# Patient Record
Sex: MALE | Race: WHITE
[De-identification: names, ages, dates, MRNs, and addresses within clinical notes are randomized per-mention and may not be internally consistent; named-entity substitution may affect disease eponyms.]

---

## 2019-07-18 ENCOUNTER — HOSPITAL ENCOUNTER (EMERGENCY)
Dept: HOSPITAL 8 - ED | Age: 64
Discharge: HOME | End: 2019-07-18
Payer: MEDICAID

## 2019-07-18 VITALS — DIASTOLIC BLOOD PRESSURE: 73 MMHG | SYSTOLIC BLOOD PRESSURE: 117 MMHG

## 2019-07-18 VITALS — WEIGHT: 218.26 LBS | BODY MASS INDEX: 32.33 KG/M2 | HEIGHT: 69 IN

## 2019-07-18 DIAGNOSIS — F17.210: ICD-10-CM

## 2019-07-18 DIAGNOSIS — I10: ICD-10-CM

## 2019-07-18 DIAGNOSIS — I48.92: ICD-10-CM

## 2019-07-18 DIAGNOSIS — I48.91: Primary | ICD-10-CM

## 2019-07-18 LAB
ALBUMIN SERPL-MCNC: 3.5 G/DL (ref 3.4–5)
ANION GAP SERPL CALC-SCNC: 6 MMOL/L (ref 5–15)
APTT BLD: 30 SECONDS (ref 25–31)
BASOPHILS # BLD AUTO: 0.04 X10^3/UL (ref 0–0.1)
BASOPHILS NFR BLD AUTO: 1 % (ref 0–1)
CALCIUM SERPL-MCNC: 8.2 MG/DL (ref 8.5–10.1)
CHLORIDE SERPL-SCNC: 115 MMOL/L (ref 98–107)
CREAT SERPL-MCNC: 0.96 MG/DL (ref 0.7–1.3)
EOSINOPHIL # BLD AUTO: 0.09 X10^3/UL (ref 0–0.4)
EOSINOPHIL NFR BLD AUTO: 1 % (ref 1–7)
ERYTHROCYTE [DISTWIDTH] IN BLOOD BY AUTOMATED COUNT: 12.9 % (ref 9.4–14.8)
INR PPP: 0.98 (ref 0.93–1.1)
LYMPHOCYTES # BLD AUTO: 2.96 X10^3/UL (ref 1–3.4)
LYMPHOCYTES NFR BLD AUTO: 33 % (ref 22–44)
MCH RBC QN AUTO: 33.1 PG (ref 27.5–34.5)
MCHC RBC AUTO-ENTMCNC: 33.8 G/DL (ref 33.2–36.2)
MCV RBC AUTO: 97.8 FL (ref 81–97)
MD: NO
MONOCYTES # BLD AUTO: 0.69 X10^3/UL (ref 0.2–0.8)
MONOCYTES NFR BLD AUTO: 8 % (ref 2–9)
NEUTROPHILS # BLD AUTO: 5.17 X10^3/UL (ref 1.8–6.8)
NEUTROPHILS NFR BLD AUTO: 58 % (ref 42–75)
PLATELET # BLD AUTO: 166 X10^3/UL (ref 130–400)
PMV BLD AUTO: 9.1 FL (ref 7.4–10.4)
PROTHROMBIN TIME: 10.3 SECONDS (ref 9.6–11.5)
RBC # BLD AUTO: 4.97 X10^6/UL (ref 4.38–5.82)
TROPONIN I SERPL-MCNC: < 0.015 NG/ML (ref 0–0.04)

## 2019-07-18 PROCEDURE — 93005 ELECTROCARDIOGRAM TRACING: CPT

## 2019-07-18 PROCEDURE — 84484 ASSAY OF TROPONIN QUANT: CPT

## 2019-07-18 PROCEDURE — 85730 THROMBOPLASTIN TIME PARTIAL: CPT

## 2019-07-18 PROCEDURE — 80048 BASIC METABOLIC PNL TOTAL CA: CPT

## 2019-07-18 PROCEDURE — 36415 COLL VENOUS BLD VENIPUNCTURE: CPT

## 2019-07-18 PROCEDURE — 71045 X-RAY EXAM CHEST 1 VIEW: CPT

## 2019-07-18 PROCEDURE — 85025 COMPLETE CBC W/AUTO DIFF WBC: CPT

## 2019-07-18 PROCEDURE — 96374 THER/PROPH/DIAG INJ IV PUSH: CPT

## 2019-07-18 PROCEDURE — 96375 TX/PRO/DX INJ NEW DRUG ADDON: CPT

## 2019-07-18 PROCEDURE — 82040 ASSAY OF SERUM ALBUMIN: CPT

## 2019-07-18 PROCEDURE — 85610 PROTHROMBIN TIME: CPT

## 2019-07-18 PROCEDURE — 92960 CARDIOVERSION ELECTRIC EXT: CPT

## 2019-07-18 PROCEDURE — 99285 EMERGENCY DEPT VISIT HI MDM: CPT

## 2019-07-18 PROCEDURE — 99152 MOD SED SAME PHYS/QHP 5/>YRS: CPT

## 2019-07-18 NOTE — NUR
PLAN CLARIFIED WITH PROVIDER: AFTER CLARIFICATION WITH PATIENT'S 
CARDIOLOGIST-AVOID ASPIRIN (TOOK ASA LESS THEN 12 HOURS), AVOID DILTSZEM. PLAN 
FOR ELECTRICAL CARDIOVERSION

WRITER OBTAINING CONSENTS FOR PROVIDER/VERIFYING ROOM SETUP

PATIENT MADE AWARE

PHYSICAL EXAM UNCHANGED 146, 115/87, 99% ON 2L NC

## 2019-07-18 NOTE — NUR
MEDICATED PER EMAR FOR 5/10 LEFT CHEST PAIN DESCRIBED AS SHARP 

  REPEAT EKG OBTAINED AS WELL

VITALS STABLE ON MONITOR 71, 110/71
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
## 2019-07-18 NOTE — NUR
Patient reports chest pain completely improved 0/10

  Patient tolerating po fluids

  Vitals remain stable

Walked to restroom w/ steady gait and was able to void



Writer reviewed symptoms to watch for/what requires re-evaluation

Plan to f/u w/ cardiologist,  start Eliquis BID (patient provided with coupon 
from provider) & avoid stress/nicotine/caffiene

## 2019-07-18 NOTE — NUR
CONSENT OBTAINED AT 8:39

 TIME OUT AT 8:40

 50MG OF PROPOFOL BY DR. HEATON AT 8:47

 30MG OF  PROPOFOL BY DR HEATON AT 8:49

 200 JOULES AT 8:50 WITH IMMEDIATE CHANGE FROM AFLUTTER/AFIB TO NORMAL SINUS 
RHYTHM

AS PATIENT RECOVERED BURPING/HEAVING NOTED- 4MG OF IV ZOFRAN  AT 8:57-NO 
VOMITING

  NO AIRWAY/BLOOD PRESSURE INTERVENTIONS NEEDED (PATIENT MAINTAINED AIRWAY W/ 
6L NC/B/P REMAIN STABLE)

  PATIENT FULLY AWAKE AND PAIN FREE AT 9:03

REMAINS IN NSR FROM 65-75

## 2019-12-12 ENCOUNTER — HOSPITAL ENCOUNTER (OUTPATIENT)
Dept: HOSPITAL 8 - CVU | Age: 64
Discharge: HOME | End: 2019-12-12
Attending: INTERNAL MEDICINE
Payer: COMMERCIAL

## 2019-12-12 DIAGNOSIS — Z72.0: ICD-10-CM

## 2019-12-12 DIAGNOSIS — I35.8: Primary | ICD-10-CM

## 2019-12-12 DIAGNOSIS — I48.91: ICD-10-CM

## 2019-12-12 PROCEDURE — 93306 TTE W/DOPPLER COMPLETE: CPT

## 2020-12-12 ENCOUNTER — HOSPITAL ENCOUNTER (OUTPATIENT)
Dept: LAB | Facility: MEDICAL CENTER | Age: 65
End: 2020-12-12
Attending: DERMATOLOGY
Payer: COMMERCIAL

## 2020-12-12 LAB
COVID ORDER STATUS COVID19: NORMAL
SARS-COV-2 RNA RESP QL NAA+PROBE: NOTDETECTED
SPECIMEN SOURCE: NORMAL

## 2020-12-12 PROCEDURE — U0003 INFECTIOUS AGENT DETECTION BY NUCLEIC ACID (DNA OR RNA); SEVERE ACUTE RESPIRATORY SYNDROME CORONAVIRUS 2 (SARS-COV-2) (CORONAVIRUS DISEASE [COVID-19]), AMPLIFIED PROBE TECHNIQUE, MAKING USE OF HIGH THROUGHPUT TECHNOLOGIES AS DESCRIBED BY CMS-2020-01-R: HCPCS

## 2020-12-12 PROCEDURE — C9803 HOPD COVID-19 SPEC COLLECT: HCPCS

## 2021-03-24 ENCOUNTER — IMMUNIZATION (OUTPATIENT)
Dept: FAMILY PLANNING/WOMEN'S HEALTH CLINIC | Facility: IMMUNIZATION CENTER | Age: 66
End: 2021-03-24
Payer: COMMERCIAL

## 2021-03-24 DIAGNOSIS — Z23 ENCOUNTER FOR VACCINATION: Primary | ICD-10-CM

## 2021-03-24 PROCEDURE — 91300 PFIZER SARS-COV-2 VACCINE: CPT

## 2021-03-24 PROCEDURE — 0001A PFIZER SARS-COV-2 VACCINE: CPT

## 2021-04-16 ENCOUNTER — IMMUNIZATION (OUTPATIENT)
Dept: FAMILY PLANNING/WOMEN'S HEALTH CLINIC | Facility: IMMUNIZATION CENTER | Age: 66
End: 2021-04-16
Attending: INTERNAL MEDICINE
Payer: OTHER GOVERNMENT

## 2021-04-16 DIAGNOSIS — Z23 ENCOUNTER FOR VACCINATION: Primary | ICD-10-CM

## 2021-04-16 PROCEDURE — 0002A PFIZER SARS-COV-2 VACCINE: CPT

## 2021-04-16 PROCEDURE — 91300 PFIZER SARS-COV-2 VACCINE: CPT

## 2022-08-17 ENCOUNTER — APPOINTMENT (OUTPATIENT)
Dept: RADIOLOGY | Facility: MEDICAL CENTER | Age: 67
DRG: 958 | End: 2022-08-17
Attending: ORTHOPAEDIC SURGERY
Payer: MEDICARE

## 2022-08-17 ENCOUNTER — APPOINTMENT (OUTPATIENT)
Dept: RADIOLOGY | Facility: MEDICAL CENTER | Age: 67
DRG: 958 | End: 2022-08-17
Attending: EMERGENCY MEDICINE
Payer: MEDICARE

## 2022-08-17 ENCOUNTER — APPOINTMENT (OUTPATIENT)
Dept: RADIOLOGY | Facility: MEDICAL CENTER | Age: 67
DRG: 958 | End: 2022-08-17
Attending: SURGERY
Payer: MEDICARE

## 2022-08-17 ENCOUNTER — APPOINTMENT (OUTPATIENT)
Dept: RADIOLOGY | Facility: MEDICAL CENTER | Age: 67
DRG: 958 | End: 2022-08-17
Attending: NURSE PRACTITIONER
Payer: MEDICARE

## 2022-08-17 ENCOUNTER — ANESTHESIA (OUTPATIENT)
Dept: SURGERY | Facility: MEDICAL CENTER | Age: 67
DRG: 958 | End: 2022-08-17
Payer: MEDICARE

## 2022-08-17 ENCOUNTER — ANESTHESIA EVENT (OUTPATIENT)
Dept: SURGERY | Facility: MEDICAL CENTER | Age: 67
DRG: 958 | End: 2022-08-17
Payer: MEDICARE

## 2022-08-17 ENCOUNTER — HOSPITAL ENCOUNTER (INPATIENT)
Facility: MEDICAL CENTER | Age: 67
LOS: 13 days | DRG: 958 | End: 2022-08-30
Attending: EMERGENCY MEDICINE | Admitting: SURGERY
Payer: MEDICARE

## 2022-08-17 DIAGNOSIS — R18.8 FREE FLUID IN PELVIS: ICD-10-CM

## 2022-08-17 DIAGNOSIS — S32.422A CLOSED DISPLACED FRACTURE OF POSTERIOR WALL OF LEFT ACETABULUM, INITIAL ENCOUNTER (HCC): ICD-10-CM

## 2022-08-17 DIAGNOSIS — S81.832A: ICD-10-CM

## 2022-08-17 DIAGNOSIS — Z78.9 NO CONTRAINDICATION TO DEEP VEIN THROMBOSIS (DVT) PROPHYLAXIS: ICD-10-CM

## 2022-08-17 DIAGNOSIS — M79.89 LEFT ARM SWELLING: ICD-10-CM

## 2022-08-17 DIAGNOSIS — S22.42XA: ICD-10-CM

## 2022-08-17 DIAGNOSIS — S32.009A CLOSED FRACTURE OF TRANSVERSE PROCESS OF LUMBAR VERTEBRA, INITIAL ENCOUNTER (HCC): ICD-10-CM

## 2022-08-17 DIAGNOSIS — S25.01XA: ICD-10-CM

## 2022-08-17 DIAGNOSIS — S82.402C TYPE III OPEN FRACTURE OF LEFT TIBIA AND FIBULA, INITIAL ENCOUNTER: ICD-10-CM

## 2022-08-17 DIAGNOSIS — S82.202C TYPE III OPEN FRACTURE OF LEFT TIBIA AND FIBULA, INITIAL ENCOUNTER: ICD-10-CM

## 2022-08-17 DIAGNOSIS — T14.90XA TRAUMA: ICD-10-CM

## 2022-08-17 PROBLEM — N40.0 BENIGN PROSTATIC HYPERPLASIA WITHOUT LOWER URINARY TRACT SYMPTOMS: Chronic | Status: ACTIVE | Noted: 2022-08-17

## 2022-08-17 PROBLEM — S22.39XA RIB FRACTURE: Status: ACTIVE | Noted: 2022-08-17

## 2022-08-17 PROBLEM — Z53.09 CONTRAINDICATION TO DEEP VEIN THROMBOSIS (DVT) PROPHYLAXIS: Status: ACTIVE | Noted: 2022-08-17

## 2022-08-17 PROBLEM — S82.402B OPEN FRACTURE OF LEFT TIBIA AND FIBULA: Status: ACTIVE | Noted: 2022-08-17

## 2022-08-17 PROBLEM — S82.202B OPEN FRACTURE OF LEFT TIBIA AND FIBULA: Status: ACTIVE | Noted: 2022-08-17

## 2022-08-17 PROBLEM — S42.293A HUMERAL HEAD FRACTURE: Status: ACTIVE | Noted: 2022-08-17

## 2022-08-17 LAB
ABO GROUP BLD: NORMAL
ALBUMIN SERPL BCP-MCNC: 4.1 G/DL (ref 3.2–4.9)
ALBUMIN/GLOB SERPL: 1.6 G/DL
ALP SERPL-CCNC: 44 U/L (ref 30–99)
ALT SERPL-CCNC: 97 U/L (ref 2–50)
ANION GAP SERPL CALC-SCNC: 10 MMOL/L (ref 7–16)
APTT PPP: 24.9 SEC (ref 24.7–36)
AST SERPL-CCNC: 113 U/L (ref 12–45)
BILIRUB SERPL-MCNC: 0.7 MG/DL (ref 0.1–1.5)
BLD GP AB SCN SERPL QL: NORMAL
BUN SERPL-MCNC: 15 MG/DL (ref 8–22)
CALCIUM SERPL-MCNC: 8.6 MG/DL (ref 8.5–10.5)
CHLORIDE SERPL-SCNC: 108 MMOL/L (ref 96–112)
CO2 SERPL-SCNC: 20 MMOL/L (ref 20–33)
CREAT SERPL-MCNC: 1 MG/DL (ref 0.5–1.4)
ERYTHROCYTE [DISTWIDTH] IN BLOOD BY AUTOMATED COUNT: 48 FL (ref 35.9–50)
ETHANOL BLD-MCNC: <10.1 MG/DL
GFR SERPLBLD CREATININE-BSD FMLA CKD-EPI: 83 ML/MIN/1.73 M 2
GLOBULIN SER CALC-MCNC: 2.6 G/DL (ref 1.9–3.5)
GLUCOSE SERPL-MCNC: 133 MG/DL (ref 65–99)
HCT VFR BLD AUTO: 42.8 % (ref 42–52)
HGB BLD-MCNC: 14.7 G/DL (ref 14–18)
INR PPP: 1.07 (ref 0.87–1.13)
MCH RBC QN AUTO: 33.2 PG (ref 27–33)
MCHC RBC AUTO-ENTMCNC: 34.3 G/DL (ref 33.7–35.3)
MCV RBC AUTO: 96.6 FL (ref 81.4–97.8)
PLATELET # BLD AUTO: 149 K/UL (ref 164–446)
PMV BLD AUTO: 11.1 FL (ref 9–12.9)
POTASSIUM SERPL-SCNC: 4.1 MMOL/L (ref 3.6–5.5)
PROT SERPL-MCNC: 6.7 G/DL (ref 6–8.2)
PROTHROMBIN TIME: 13.8 SEC (ref 12–14.6)
RBC # BLD AUTO: 4.43 M/UL (ref 4.7–6.1)
RH BLD: NORMAL
SODIUM SERPL-SCNC: 138 MMOL/L (ref 135–145)
WBC # BLD AUTO: 14.4 K/UL (ref 4.8–10.8)

## 2022-08-17 PROCEDURE — 73090 X-RAY EXAM OF FOREARM: CPT | Mod: LT

## 2022-08-17 PROCEDURE — 700111 HCHG RX REV CODE 636 W/ 250 OVERRIDE (IP): Performed by: ANESTHESIOLOGY

## 2022-08-17 PROCEDURE — 160002 HCHG RECOVERY MINUTES (STAT): Performed by: SURGERY

## 2022-08-17 PROCEDURE — 72125 CT NECK SPINE W/O DYE: CPT

## 2022-08-17 PROCEDURE — 71260 CT THORAX DX C+: CPT

## 2022-08-17 PROCEDURE — 0QSH35Z REPOSITION LEFT TIBIA WITH EXTERNAL FIXATION DEVICE, PERCUTANEOUS APPROACH: ICD-10-PCS | Performed by: ORTHOPAEDIC SURGERY

## 2022-08-17 PROCEDURE — 502240 HCHG MISC OR SUPPLY RC 0272: Performed by: SURGERY

## 2022-08-17 PROCEDURE — 700101 HCHG RX REV CODE 250: Performed by: ANESTHESIOLOGY

## 2022-08-17 PROCEDURE — 700111 HCHG RX REV CODE 636 W/ 250 OVERRIDE (IP): Performed by: ORTHOPAEDIC SURGERY

## 2022-08-17 PROCEDURE — 700105 HCHG RX REV CODE 258: Performed by: SURGERY

## 2022-08-17 PROCEDURE — 770022 HCHG ROOM/CARE - ICU (200)

## 2022-08-17 PROCEDURE — 700117 HCHG RX CONTRAST REV CODE 255: Performed by: SURGERY

## 2022-08-17 PROCEDURE — 160009 HCHG ANES TIME/MIN: Performed by: SURGERY

## 2022-08-17 PROCEDURE — 02VW3DZ RESTRICTION OF THORACIC AORTA, DESCENDING WITH INTRALUMINAL DEVICE, PERCUTANEOUS APPROACH: ICD-10-PCS | Performed by: SURGERY

## 2022-08-17 PROCEDURE — 80053 COMPREHEN METABOLIC PANEL: CPT

## 2022-08-17 PROCEDURE — 99291 CRITICAL CARE FIRST HOUR: CPT

## 2022-08-17 PROCEDURE — 110454 HCHG SHELL REV 250: Performed by: SURGERY

## 2022-08-17 PROCEDURE — 82077 ASSAY SPEC XCP UR&BREATH IA: CPT

## 2022-08-17 PROCEDURE — 72170 X-RAY EXAM OF PELVIS: CPT

## 2022-08-17 PROCEDURE — 20690 APPL UNIPLN UNI EXT FIXJ SYS: CPT | Mod: LT | Performed by: ORTHOPAEDIC SURGERY

## 2022-08-17 PROCEDURE — 99221 1ST HOSP IP/OBS SF/LOW 40: CPT | Mod: 25 | Performed by: SURGERY

## 2022-08-17 PROCEDURE — 36556 INSERT NON-TUNNEL CV CATH: CPT | Performed by: SURGERY

## 2022-08-17 PROCEDURE — 73120 X-RAY EXAM OF HAND: CPT | Mod: LT

## 2022-08-17 PROCEDURE — 700117 HCHG RX CONTRAST REV CODE 255: Performed by: EMERGENCY MEDICINE

## 2022-08-17 PROCEDURE — 71045 X-RAY EXAM CHEST 1 VIEW: CPT

## 2022-08-17 PROCEDURE — 36415 COLL VENOUS BLD VENIPUNCTURE: CPT

## 2022-08-17 PROCEDURE — C1760 CLOSURE DEV, VASC: HCPCS | Performed by: SURGERY

## 2022-08-17 PROCEDURE — 72128 CT CHEST SPINE W/O DYE: CPT

## 2022-08-17 PROCEDURE — 27372 REMOVAL OF FOREIGN BODY: CPT | Mod: LT | Performed by: ORTHOPAEDIC SURGERY

## 2022-08-17 PROCEDURE — 700105 HCHG RX REV CODE 258: Performed by: ANESTHESIOLOGY

## 2022-08-17 PROCEDURE — 700111 HCHG RX REV CODE 636 W/ 250 OVERRIDE (IP): Performed by: SURGERY

## 2022-08-17 PROCEDURE — 700111 HCHG RX REV CODE 636 W/ 250 OVERRIDE (IP)

## 2022-08-17 PROCEDURE — 96374 THER/PROPH/DIAG INJ IV PUSH: CPT

## 2022-08-17 PROCEDURE — 110371 HCHG SHELL REV 272: Performed by: SURGERY

## 2022-08-17 PROCEDURE — 70450 CT HEAD/BRAIN W/O DYE: CPT

## 2022-08-17 PROCEDURE — C1769 GUIDE WIRE: HCPCS | Performed by: SURGERY

## 2022-08-17 PROCEDURE — 01392 ANES OPN PX UPR TIB FIB&/PAT: CPT | Performed by: ANESTHESIOLOGY

## 2022-08-17 PROCEDURE — 85730 THROMBOPLASTIN TIME PARTIAL: CPT

## 2022-08-17 PROCEDURE — 700117 HCHG RX CONTRAST REV CODE 255: Performed by: NURSE PRACTITIONER

## 2022-08-17 PROCEDURE — 11012 DEB SKIN BONE AT FX SITE: CPT | Mod: LT | Performed by: ORTHOPAEDIC SURGERY

## 2022-08-17 PROCEDURE — 0JCM3ZZ EXTIRPATION OF MATTER FROM LEFT UPPER LEG SUBCUTANEOUS TISSUE AND FASCIA, PERCUTANEOUS APPROACH: ICD-10-PCS | Performed by: ORTHOPAEDIC SURGERY

## 2022-08-17 PROCEDURE — 20103 EXPL PENTRG WOUND EXTREMITY: CPT | Mod: 59 | Performed by: ORTHOPAEDIC SURGERY

## 2022-08-17 PROCEDURE — 36620 INSERTION CATHETER ARTERY: CPT | Performed by: SURGERY

## 2022-08-17 PROCEDURE — C1887 CATHETER, GUIDING: HCPCS | Performed by: SURGERY

## 2022-08-17 PROCEDURE — 85018 HEMOGLOBIN: CPT

## 2022-08-17 PROCEDURE — 700101 HCHG RX REV CODE 250: Performed by: SPECIALIST

## 2022-08-17 PROCEDURE — 73706 CT ANGIO LWR EXTR W/O&W/DYE: CPT | Mod: LT

## 2022-08-17 PROCEDURE — 700101 HCHG RX REV CODE 250: Performed by: NURSE PRACTITIONER

## 2022-08-17 PROCEDURE — 99223 1ST HOSP IP/OBS HIGH 75: CPT | Performed by: ORTHOPAEDIC SURGERY

## 2022-08-17 PROCEDURE — 03HY32Z INSERTION OF MONITORING DEVICE INTO UPPER ARTERY, PERCUTANEOUS APPROACH: ICD-10-PCS | Performed by: SURGERY

## 2022-08-17 PROCEDURE — 0KBR0ZZ EXCISION OF LEFT UPPER LEG MUSCLE, OPEN APPROACH: ICD-10-PCS | Performed by: ORTHOPAEDIC SURGERY

## 2022-08-17 PROCEDURE — 34705 EVAC RPR A-BIILIAC NDGFT: CPT | Performed by: SURGERY

## 2022-08-17 PROCEDURE — 36620 INSERTION CATHETER ARTERY: CPT

## 2022-08-17 PROCEDURE — 160029 HCHG SURGERY MINUTES - 1ST 30 MINS LEVEL 4: Performed by: SURGERY

## 2022-08-17 PROCEDURE — C1894 INTRO/SHEATH, NON-LASER: HCPCS | Performed by: SURGERY

## 2022-08-17 PROCEDURE — C1725 CATH, TRANSLUMIN NON-LASER: HCPCS | Performed by: SURGERY

## 2022-08-17 PROCEDURE — 72131 CT LUMBAR SPINE W/O DYE: CPT

## 2022-08-17 PROCEDURE — 86850 RBC ANTIBODY SCREEN: CPT

## 2022-08-17 PROCEDURE — 85027 COMPLETE CBC AUTOMATED: CPT

## 2022-08-17 PROCEDURE — G0390 TRAUMA RESPONS W/HOSP CRITI: HCPCS

## 2022-08-17 PROCEDURE — 73590 X-RAY EXAM OF LOWER LEG: CPT | Mod: LT

## 2022-08-17 PROCEDURE — 160048 HCHG OR STATISTICAL LEVEL 1-5: Performed by: SURGERY

## 2022-08-17 PROCEDURE — C1751 CATH, INF, PER/CENT/MIDLINE: HCPCS

## 2022-08-17 PROCEDURE — 36556 INSERT NON-TUNNEL CV CATH: CPT

## 2022-08-17 PROCEDURE — 96375 TX/PRO/DX INJ NEW DRUG ADDON: CPT

## 2022-08-17 PROCEDURE — 3E0234Z INTRODUCTION OF SERUM, TOXOID AND VACCINE INTO MUSCLE, PERCUTANEOUS APPROACH: ICD-10-PCS | Performed by: EMERGENCY MEDICINE

## 2022-08-17 PROCEDURE — 3E0T3BZ INTRODUCTION OF ANESTHETIC AGENT INTO PERIPHERAL NERVES AND PLEXI, PERCUTANEOUS APPROACH: ICD-10-PCS | Performed by: ANESTHESIOLOGY

## 2022-08-17 PROCEDURE — 86900 BLOOD TYPING SEROLOGIC ABO: CPT

## 2022-08-17 PROCEDURE — 85610 PROTHROMBIN TIME: CPT

## 2022-08-17 PROCEDURE — 99291 CRITICAL CARE FIRST HOUR: CPT | Mod: 25 | Performed by: SURGERY

## 2022-08-17 PROCEDURE — 90715 TDAP VACCINE 7 YRS/> IM: CPT | Performed by: EMERGENCY MEDICINE

## 2022-08-17 PROCEDURE — 160035 HCHG PACU - 1ST 60 MINS PHASE I: Performed by: SURGERY

## 2022-08-17 PROCEDURE — 73600 X-RAY EXAM OF ANKLE: CPT | Mod: LT

## 2022-08-17 PROCEDURE — 90471 IMMUNIZATION ADMIN: CPT

## 2022-08-17 PROCEDURE — 502000 HCHG MISC OR IMPLANTS RC 0278: Performed by: SURGERY

## 2022-08-17 PROCEDURE — 700102 HCHG RX REV CODE 250 W/ 637 OVERRIDE(OP): Performed by: SPECIALIST

## 2022-08-17 PROCEDURE — 160041 HCHG SURGERY MINUTES - EA ADDL 1 MIN LEVEL 4: Performed by: SURGERY

## 2022-08-17 PROCEDURE — 700111 HCHG RX REV CODE 636 W/ 250 OVERRIDE (IP): Performed by: EMERGENCY MEDICINE

## 2022-08-17 PROCEDURE — 02H633Z INSERTION OF INFUSION DEVICE INTO RIGHT ATRIUM, PERCUTANEOUS APPROACH: ICD-10-PCS | Performed by: SURGERY

## 2022-08-17 PROCEDURE — A9270 NON-COVERED ITEM OR SERVICE: HCPCS | Performed by: SPECIALIST

## 2022-08-17 PROCEDURE — 700101 HCHG RX REV CODE 250: Performed by: SURGERY

## 2022-08-17 PROCEDURE — 86901 BLOOD TYPING SEROLOGIC RH(D): CPT

## 2022-08-17 DEVICE — DEVICE CLSR 6FR HMST IMPL SLF STS PLUS ANGIOSEAL (10EA/CA): Type: IMPLANTABLE DEVICE | Site: GROIN | Status: FUNCTIONAL

## 2022-08-17 DEVICE — GRAFT STENT TAG CONFORM THORACIS GORE L10 CM OD34 MM (1EA): Type: IMPLANTABLE DEVICE | Site: GROIN | Status: FUNCTIONAL

## 2022-08-17 RX ORDER — HYDROMORPHONE HYDROCHLORIDE 1 MG/ML
0.1 INJECTION, SOLUTION INTRAMUSCULAR; INTRAVENOUS; SUBCUTANEOUS
Status: DISCONTINUED | OUTPATIENT
Start: 2022-08-17 | End: 2022-08-17 | Stop reason: HOSPADM

## 2022-08-17 RX ORDER — ONDANSETRON 2 MG/ML
INJECTION INTRAMUSCULAR; INTRAVENOUS PRN
Status: DISCONTINUED | OUTPATIENT
Start: 2022-08-17 | End: 2022-08-17 | Stop reason: SURG

## 2022-08-17 RX ORDER — SODIUM CHLORIDE, SODIUM LACTATE, POTASSIUM CHLORIDE, CALCIUM CHLORIDE 600; 310; 30; 20 MG/100ML; MG/100ML; MG/100ML; MG/100ML
INJECTION, SOLUTION INTRAVENOUS
Status: DISCONTINUED | OUTPATIENT
Start: 2022-08-17 | End: 2022-08-17 | Stop reason: SURG

## 2022-08-17 RX ORDER — IODIXANOL 270 MG/ML
INJECTION, SOLUTION INTRAVASCULAR
Status: DISCONTINUED | OUTPATIENT
Start: 2022-08-17 | End: 2022-08-17 | Stop reason: HOSPADM

## 2022-08-17 RX ORDER — ONDANSETRON 2 MG/ML
INJECTION INTRAMUSCULAR; INTRAVENOUS
Status: DISPENSED
Start: 2022-08-17 | End: 2022-08-18

## 2022-08-17 RX ORDER — FAMOTIDINE 20 MG/1
20 TABLET, FILM COATED ORAL 2 TIMES DAILY
Status: DISCONTINUED | OUTPATIENT
Start: 2022-08-17 | End: 2022-08-20

## 2022-08-17 RX ORDER — HYDRALAZINE HYDROCHLORIDE 20 MG/ML
5 INJECTION INTRAMUSCULAR; INTRAVENOUS
Status: DISCONTINUED | OUTPATIENT
Start: 2022-08-17 | End: 2022-08-17 | Stop reason: HOSPADM

## 2022-08-17 RX ORDER — ONDANSETRON 2 MG/ML
4 INJECTION INTRAMUSCULAR; INTRAVENOUS EVERY 4 HOURS PRN
Status: DISCONTINUED | OUTPATIENT
Start: 2022-08-17 | End: 2022-08-30 | Stop reason: HOSPADM

## 2022-08-17 RX ORDER — HEPARIN SODIUM,PORCINE 1000/ML
VIAL (ML) INJECTION PRN
Status: DISCONTINUED | OUTPATIENT
Start: 2022-08-17 | End: 2022-08-17 | Stop reason: SURG

## 2022-08-17 RX ORDER — DIPHENHYDRAMINE HYDROCHLORIDE 50 MG/ML
12.5 INJECTION INTRAMUSCULAR; INTRAVENOUS
Status: DISCONTINUED | OUTPATIENT
Start: 2022-08-17 | End: 2022-08-17 | Stop reason: HOSPADM

## 2022-08-17 RX ORDER — METAXALONE 800 MG/1
400 TABLET ORAL 3 TIMES DAILY
Status: DISCONTINUED | OUTPATIENT
Start: 2022-08-17 | End: 2022-08-27

## 2022-08-17 RX ORDER — HYDRALAZINE HYDROCHLORIDE 20 MG/ML
INJECTION INTRAMUSCULAR; INTRAVENOUS PRN
Status: DISCONTINUED | OUTPATIENT
Start: 2022-08-17 | End: 2022-08-17 | Stop reason: SURG

## 2022-08-17 RX ORDER — BISACODYL 10 MG
10 SUPPOSITORY, RECTAL RECTAL
Status: DISCONTINUED | OUTPATIENT
Start: 2022-08-17 | End: 2022-08-30 | Stop reason: HOSPADM

## 2022-08-17 RX ORDER — HYDROMORPHONE HYDROCHLORIDE 1 MG/ML
INJECTION, SOLUTION INTRAMUSCULAR; INTRAVENOUS; SUBCUTANEOUS
Status: COMPLETED
Start: 2022-08-17 | End: 2022-08-17

## 2022-08-17 RX ORDER — LABETALOL HYDROCHLORIDE 5 MG/ML
10 INJECTION, SOLUTION INTRAVENOUS ONCE
Status: COMPLETED | OUTPATIENT
Start: 2022-08-17 | End: 2022-08-17

## 2022-08-17 RX ORDER — ENEMA 19; 7 G/133ML; G/133ML
1 ENEMA RECTAL
Status: DISCONTINUED | OUTPATIENT
Start: 2022-08-17 | End: 2022-08-30 | Stop reason: HOSPADM

## 2022-08-17 RX ORDER — HYDROMORPHONE HYDROCHLORIDE 1 MG/ML
0.4 INJECTION, SOLUTION INTRAMUSCULAR; INTRAVENOUS; SUBCUTANEOUS
Status: DISCONTINUED | OUTPATIENT
Start: 2022-08-17 | End: 2022-08-17 | Stop reason: HOSPADM

## 2022-08-17 RX ORDER — LIDOCAINE 50 MG/G
1 PATCH TOPICAL EVERY 24 HOURS
Status: DISCONTINUED | OUTPATIENT
Start: 2022-08-17 | End: 2022-08-30 | Stop reason: HOSPADM

## 2022-08-17 RX ORDER — POLYETHYLENE GLYCOL 3350 17 G/17G
1 POWDER, FOR SOLUTION ORAL 2 TIMES DAILY
Status: DISCONTINUED | OUTPATIENT
Start: 2022-08-17 | End: 2022-08-30 | Stop reason: HOSPADM

## 2022-08-17 RX ORDER — CEFAZOLIN 2 G/1
INJECTION, POWDER, FOR SOLUTION INTRAMUSCULAR; INTRAVENOUS
Status: COMPLETED | OUTPATIENT
Start: 2022-08-17 | End: 2022-08-17

## 2022-08-17 RX ORDER — ETOMIDATE 2 MG/ML
INJECTION INTRAVENOUS PRN
Status: DISCONTINUED | OUTPATIENT
Start: 2022-08-17 | End: 2022-08-17 | Stop reason: SURG

## 2022-08-17 RX ORDER — ALBUTEROL SULFATE 2.5 MG/3ML
2.5 SOLUTION RESPIRATORY (INHALATION)
Status: DISCONTINUED | OUTPATIENT
Start: 2022-08-17 | End: 2022-08-17 | Stop reason: HOSPADM

## 2022-08-17 RX ORDER — AMOXICILLIN 250 MG
1 CAPSULE ORAL
Status: DISCONTINUED | OUTPATIENT
Start: 2022-08-17 | End: 2022-08-30 | Stop reason: HOSPADM

## 2022-08-17 RX ORDER — SODIUM CHLORIDE, SODIUM LACTATE, POTASSIUM CHLORIDE, CALCIUM CHLORIDE 600; 310; 30; 20 MG/100ML; MG/100ML; MG/100ML; MG/100ML
INJECTION, SOLUTION INTRAVENOUS CONTINUOUS
Status: DISCONTINUED | OUTPATIENT
Start: 2022-08-17 | End: 2022-08-27

## 2022-08-17 RX ORDER — CEFAZOLIN SODIUM 2 G/100ML
2 INJECTION, SOLUTION INTRAVENOUS EVERY 8 HOURS
Status: DISCONTINUED | OUTPATIENT
Start: 2022-08-18 | End: 2022-08-19

## 2022-08-17 RX ORDER — OXYCODONE HCL 5 MG/5 ML
5 SOLUTION, ORAL ORAL
Status: DISCONTINUED | OUTPATIENT
Start: 2022-08-17 | End: 2022-08-17 | Stop reason: HOSPADM

## 2022-08-17 RX ORDER — OXYCODONE HCL 5 MG/5 ML
10 SOLUTION, ORAL ORAL
Status: DISCONTINUED | OUTPATIENT
Start: 2022-08-17 | End: 2022-08-17 | Stop reason: HOSPADM

## 2022-08-17 RX ORDER — CEFAZOLIN SODIUM 2 G/100ML
2 INJECTION, SOLUTION INTRAVENOUS EVERY 8 HOURS
Status: DISCONTINUED | OUTPATIENT
Start: 2022-08-18 | End: 2022-08-17

## 2022-08-17 RX ORDER — GABAPENTIN 100 MG/1
100 CAPSULE ORAL 3 TIMES DAILY
Status: DISCONTINUED | OUTPATIENT
Start: 2022-08-17 | End: 2022-08-29

## 2022-08-17 RX ORDER — ONDANSETRON 4 MG/1
4 TABLET, ORALLY DISINTEGRATING ORAL EVERY 4 HOURS PRN
Status: DISCONTINUED | OUTPATIENT
Start: 2022-08-17 | End: 2022-08-30 | Stop reason: HOSPADM

## 2022-08-17 RX ORDER — PHENYLEPHRINE HYDROCHLORIDE 10 MG/ML
INJECTION, SOLUTION INTRAMUSCULAR; INTRAVENOUS; SUBCUTANEOUS PRN
Status: DISCONTINUED | OUTPATIENT
Start: 2022-08-17 | End: 2022-08-17 | Stop reason: SURG

## 2022-08-17 RX ORDER — HYDROMORPHONE HYDROCHLORIDE 1 MG/ML
0.2 INJECTION, SOLUTION INTRAMUSCULAR; INTRAVENOUS; SUBCUTANEOUS
Status: DISCONTINUED | OUTPATIENT
Start: 2022-08-17 | End: 2022-08-17 | Stop reason: HOSPADM

## 2022-08-17 RX ORDER — CEFAZOLIN SODIUM 1 G/3ML
INJECTION, POWDER, FOR SOLUTION INTRAMUSCULAR; INTRAVENOUS PRN
Status: DISCONTINUED | OUTPATIENT
Start: 2022-08-17 | End: 2022-08-17 | Stop reason: SURG

## 2022-08-17 RX ORDER — AMOXICILLIN 250 MG
1 CAPSULE ORAL NIGHTLY
Status: DISCONTINUED | OUTPATIENT
Start: 2022-08-17 | End: 2022-08-30 | Stop reason: HOSPADM

## 2022-08-17 RX ORDER — HYDRALAZINE HYDROCHLORIDE 20 MG/ML
10 INJECTION INTRAMUSCULAR; INTRAVENOUS ONCE
Status: DISCONTINUED | OUTPATIENT
Start: 2022-08-17 | End: 2022-08-18

## 2022-08-17 RX ORDER — HYDROMORPHONE HYDROCHLORIDE 1 MG/ML
INJECTION, SOLUTION INTRAMUSCULAR; INTRAVENOUS; SUBCUTANEOUS
Status: COMPLETED | OUTPATIENT
Start: 2022-08-17 | End: 2022-08-17

## 2022-08-17 RX ORDER — DOCUSATE SODIUM 100 MG/1
100 CAPSULE, LIQUID FILLED ORAL 2 TIMES DAILY
Status: DISCONTINUED | OUTPATIENT
Start: 2022-08-17 | End: 2022-08-30 | Stop reason: HOSPADM

## 2022-08-17 RX ORDER — MORPHINE SULFATE 4 MG/ML
INJECTION INTRAVENOUS
Status: DISPENSED
Start: 2022-08-17 | End: 2022-08-18

## 2022-08-17 RX ORDER — SODIUM CHLORIDE, SODIUM GLUCONATE, SODIUM ACETATE, POTASSIUM CHLORIDE AND MAGNESIUM CHLORIDE 526; 502; 368; 37; 30 MG/100ML; MG/100ML; MG/100ML; MG/100ML; MG/100ML
INJECTION, SOLUTION INTRAVENOUS
Status: DISCONTINUED | OUTPATIENT
Start: 2022-08-17 | End: 2022-08-17 | Stop reason: SURG

## 2022-08-17 RX ORDER — SODIUM CHLORIDE, SODIUM LACTATE, POTASSIUM CHLORIDE, CALCIUM CHLORIDE 600; 310; 30; 20 MG/100ML; MG/100ML; MG/100ML; MG/100ML
INJECTION, SOLUTION INTRAVENOUS CONTINUOUS
Status: DISCONTINUED | OUTPATIENT
Start: 2022-08-17 | End: 2022-08-17 | Stop reason: HOSPADM

## 2022-08-17 RX ORDER — HYDROMORPHONE HYDROCHLORIDE 1 MG/ML
1 INJECTION, SOLUTION INTRAMUSCULAR; INTRAVENOUS; SUBCUTANEOUS ONCE
Status: COMPLETED | OUTPATIENT
Start: 2022-08-17 | End: 2022-08-17

## 2022-08-17 RX ORDER — MORPHINE SULFATE 4 MG/ML
4 INJECTION INTRAVENOUS
Status: DISCONTINUED | OUTPATIENT
Start: 2022-08-17 | End: 2022-08-27

## 2022-08-17 RX ORDER — DEXAMETHASONE SODIUM PHOSPHATE 4 MG/ML
INJECTION, SOLUTION INTRA-ARTICULAR; INTRALESIONAL; INTRAMUSCULAR; INTRAVENOUS; SOFT TISSUE PRN
Status: DISCONTINUED | OUTPATIENT
Start: 2022-08-17 | End: 2022-08-17 | Stop reason: SURG

## 2022-08-17 RX ORDER — HYDRALAZINE HYDROCHLORIDE 20 MG/ML
10 INJECTION INTRAMUSCULAR; INTRAVENOUS EVERY 4 HOURS PRN
Status: DISCONTINUED | OUTPATIENT
Start: 2022-08-17 | End: 2022-08-30 | Stop reason: HOSPADM

## 2022-08-17 RX ORDER — HALOPERIDOL 5 MG/ML
1 INJECTION INTRAMUSCULAR
Status: DISCONTINUED | OUTPATIENT
Start: 2022-08-17 | End: 2022-08-17 | Stop reason: HOSPADM

## 2022-08-17 RX ORDER — LABETALOL HYDROCHLORIDE 5 MG/ML
5 INJECTION, SOLUTION INTRAVENOUS
Status: DISCONTINUED | OUTPATIENT
Start: 2022-08-17 | End: 2022-08-17 | Stop reason: HOSPADM

## 2022-08-17 RX ORDER — PROTAMINE SULFATE 10 MG/ML
INJECTION, SOLUTION INTRAVENOUS PRN
Status: DISCONTINUED | OUTPATIENT
Start: 2022-08-17 | End: 2022-08-17 | Stop reason: SURG

## 2022-08-17 RX ORDER — KETAMINE HYDROCHLORIDE 50 MG/ML
INJECTION, SOLUTION, CONCENTRATE INTRAMUSCULAR; INTRAVENOUS PRN
Status: DISCONTINUED | OUTPATIENT
Start: 2022-08-17 | End: 2022-08-17 | Stop reason: SURG

## 2022-08-17 RX ORDER — ONDANSETRON 2 MG/ML
4 INJECTION INTRAMUSCULAR; INTRAVENOUS
Status: DISCONTINUED | OUTPATIENT
Start: 2022-08-17 | End: 2022-08-17 | Stop reason: HOSPADM

## 2022-08-17 RX ORDER — MORPHINE SULFATE 4 MG/ML
2 INJECTION INTRAVENOUS
Status: DISCONTINUED | OUTPATIENT
Start: 2022-08-17 | End: 2022-08-27

## 2022-08-17 RX ADMIN — FENTANYL CITRATE 50 MCG: 50 INJECTION, SOLUTION INTRAMUSCULAR; INTRAVENOUS at 18:57

## 2022-08-17 RX ADMIN — CLOSTRIDIUM TETANI TOXOID ANTIGEN (FORMALDEHYDE INACTIVATED), CORYNEBACTERIUM DIPHTHERIAE TOXOID ANTIGEN (FORMALDEHYDE INACTIVATED), BORDETELLA PERTUSSIS TOXOID ANTIGEN (GLUTARALDEHYDE INACTIVATED), BORDETELLA PERTUSSIS FILAMENTOUS HEMAGGLUTININ ANTIGEN (FORMALDEHYDE INACTIVATED), BORDETELLA PERTUSSIS PERTACTIN ANTIGEN, AND BORDETELLA PERTUSSIS FIMBRIAE 2/3 ANTIGEN 0.5 ML: 5; 2; 2.5; 5; 3; 5 INJECTION, SUSPENSION INTRAMUSCULAR at 16:34

## 2022-08-17 RX ADMIN — MORPHINE SULFATE 4 MG: 4 INJECTION INTRAVENOUS at 22:15

## 2022-08-17 RX ADMIN — HYDRALAZINE HYDROCHLORIDE 10 MG: 20 INJECTION INTRAMUSCULAR; INTRAVENOUS at 17:10

## 2022-08-17 RX ADMIN — HYDROMORPHONE HYDROCHLORIDE 0.4 MG: 1 INJECTION, SOLUTION INTRAMUSCULAR; INTRAVENOUS; SUBCUTANEOUS at 21:30

## 2022-08-17 RX ADMIN — PROTAMINE SULFATE 50 MG: 10 INJECTION, SOLUTION INTRAVENOUS at 19:04

## 2022-08-17 RX ADMIN — LABETALOL HYDROCHLORIDE 10 MG: 5 INJECTION, SOLUTION INTRAVENOUS at 17:30

## 2022-08-17 RX ADMIN — FENTANYL CITRATE 50 MCG: 50 INJECTION, SOLUTION INTRAMUSCULAR; INTRAVENOUS at 20:29

## 2022-08-17 RX ADMIN — IOHEXOL 100 ML: 350 INJECTION, SOLUTION INTRAVENOUS at 17:39

## 2022-08-17 RX ADMIN — HYDROMORPHONE HYDROCHLORIDE 1 MG: 1 INJECTION, SOLUTION INTRAMUSCULAR; INTRAVENOUS; SUBCUTANEOUS at 16:50

## 2022-08-17 RX ADMIN — SUGAMMADEX 200 MG: 100 INJECTION, SOLUTION INTRAVENOUS at 20:01

## 2022-08-17 RX ADMIN — SODIUM CHLORIDE, POTASSIUM CHLORIDE, SODIUM LACTATE AND CALCIUM CHLORIDE: 600; 310; 30; 20 INJECTION, SOLUTION INTRAVENOUS at 22:31

## 2022-08-17 RX ADMIN — FENTANYL CITRATE 50 MCG: 50 INJECTION, SOLUTION INTRAMUSCULAR; INTRAVENOUS at 18:20

## 2022-08-17 RX ADMIN — FAMOTIDINE 20 MG: 10 INJECTION, SOLUTION INTRAVENOUS at 23:32

## 2022-08-17 RX ADMIN — IOHEXOL 100 ML: 350 INJECTION, SOLUTION INTRAVENOUS at 17:30

## 2022-08-17 RX ADMIN — FENTANYL CITRATE 50 MCG: 50 INJECTION, SOLUTION INTRAMUSCULAR; INTRAVENOUS at 19:11

## 2022-08-17 RX ADMIN — CEFAZOLIN 2 G: 330 INJECTION, POWDER, FOR SOLUTION INTRAMUSCULAR; INTRAVENOUS at 17:59

## 2022-08-17 RX ADMIN — FENTANYL CITRATE 50 MCG: 50 INJECTION, SOLUTION INTRAMUSCULAR; INTRAVENOUS at 19:23

## 2022-08-17 RX ADMIN — Medication 25 MG: at 19:25

## 2022-08-17 RX ADMIN — PHENYLEPHRINE HYDROCHLORIDE 100 MCG: 10 INJECTION INTRAVENOUS at 18:27

## 2022-08-17 RX ADMIN — DEXAMETHASONE SODIUM PHOSPHATE 4 MG: 4 INJECTION, SOLUTION INTRA-ARTICULAR; INTRALESIONAL; INTRAMUSCULAR; INTRAVENOUS; SOFT TISSUE at 19:57

## 2022-08-17 RX ADMIN — ETOMIDATE 10 MG: 2 INJECTION INTRAVENOUS at 17:59

## 2022-08-17 RX ADMIN — GABAPENTIN 100 MG: 100 CAPSULE ORAL at 23:46

## 2022-08-17 RX ADMIN — ONDANSETRON 4 MG: 2 INJECTION INTRAMUSCULAR; INTRAVENOUS at 19:57

## 2022-08-17 RX ADMIN — LIDOCAINE 1 PATCH: 50 PATCH TOPICAL at 23:46

## 2022-08-17 RX ADMIN — HEPARIN SODIUM 7000 UNITS: 1000 INJECTION, SOLUTION INTRAVENOUS; SUBCUTANEOUS at 18:26

## 2022-08-17 RX ADMIN — PHENYLEPHRINE HYDROCHLORIDE 100 MCG: 10 INJECTION INTRAVENOUS at 18:05

## 2022-08-17 RX ADMIN — HYDRALAZINE HYDROCHLORIDE 10 MG: 20 INJECTION INTRAMUSCULAR; INTRAVENOUS at 20:16

## 2022-08-17 RX ADMIN — MIDAZOLAM 2 MG: 1 INJECTION INTRAMUSCULAR; INTRAVENOUS at 17:55

## 2022-08-17 RX ADMIN — ROCURONIUM BROMIDE 100 MG: 10 INJECTION, SOLUTION INTRAVENOUS at 17:59

## 2022-08-17 RX ADMIN — CEFAZOLIN SODIUM 2 G: 2 INJECTION, SOLUTION INTRAVENOUS at 23:46

## 2022-08-17 RX ADMIN — Medication 200 MG: at 17:59

## 2022-08-17 RX ADMIN — HYDROMORPHONE HYDROCHLORIDE 0.4 MG: 1 INJECTION, SOLUTION INTRAMUSCULAR; INTRAVENOUS; SUBCUTANEOUS at 20:59

## 2022-08-17 RX ADMIN — HYDROMORPHONE HYDROCHLORIDE 1 MG: 1 INJECTION, SOLUTION INTRAMUSCULAR; INTRAVENOUS; SUBCUTANEOUS at 16:33

## 2022-08-17 RX ADMIN — MORPHINE SULFATE 4 MG: 4 INJECTION INTRAVENOUS at 23:30

## 2022-08-17 RX ADMIN — SODIUM CHLORIDE, SODIUM GLUCONATE, SODIUM ACETATE, POTASSIUM CHLORIDE AND MAGNESIUM CHLORIDE: 526; 502; 368; 37; 30 INJECTION, SOLUTION INTRAVENOUS at 17:55

## 2022-08-17 RX ADMIN — METAXALONE 400 MG: 800 TABLET ORAL at 23:46

## 2022-08-17 RX ADMIN — HYDROMORPHONE HYDROCHLORIDE 0.4 MG: 1 INJECTION, SOLUTION INTRAMUSCULAR; INTRAVENOUS; SUBCUTANEOUS at 21:08

## 2022-08-17 RX ADMIN — SODIUM CHLORIDE, POTASSIUM CHLORIDE, SODIUM LACTATE AND CALCIUM CHLORIDE: 600; 310; 30; 20 INJECTION, SOLUTION INTRAVENOUS at 17:55

## 2022-08-17 RX ADMIN — PROPOFOL 100 MG: 10 INJECTION, EMULSION INTRAVENOUS at 17:59

## 2022-08-17 RX ADMIN — CEFAZOLIN 2 G: 2 INJECTION, POWDER, FOR SOLUTION INTRAMUSCULAR; INTRAVENOUS at 16:33

## 2022-08-17 ASSESSMENT — FIBROSIS 4 INDEX: FIB4 SCORE: 5.01
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
## 2022-08-18 ENCOUNTER — APPOINTMENT (OUTPATIENT)
Dept: RADIOLOGY | Facility: MEDICAL CENTER | Age: 67
DRG: 958 | End: 2022-08-18
Attending: SURGERY
Payer: MEDICARE

## 2022-08-18 ENCOUNTER — APPOINTMENT (OUTPATIENT)
Dept: RADIOLOGY | Facility: MEDICAL CENTER | Age: 67
DRG: 958 | End: 2022-08-18
Attending: NURSE PRACTITIONER
Payer: MEDICARE

## 2022-08-18 ENCOUNTER — HOSPITAL ENCOUNTER (OUTPATIENT)
Dept: RADIOLOGY | Facility: MEDICAL CENTER | Age: 67
End: 2022-08-18
Attending: ORTHOPAEDIC SURGERY
Payer: MEDICARE

## 2022-08-18 LAB
ABO + RH BLD: NORMAL
ALBUMIN SERPL BCP-MCNC: 3 G/DL (ref 3.2–4.9)
ALBUMIN/GLOB SERPL: 1.7 G/DL
ALP SERPL-CCNC: 29 U/L (ref 30–99)
ALT SERPL-CCNC: 84 U/L (ref 2–50)
ANION GAP SERPL CALC-SCNC: 8 MMOL/L (ref 7–16)
AST SERPL-CCNC: 158 U/L (ref 12–45)
BASOPHILS # BLD AUTO: 0.1 % (ref 0–1.8)
BASOPHILS # BLD AUTO: 0.2 % (ref 0–1.8)
BASOPHILS # BLD: 0.01 K/UL (ref 0–0.12)
BASOPHILS # BLD: 0.02 K/UL (ref 0–0.12)
BILIRUB SERPL-MCNC: 0.4 MG/DL (ref 0.1–1.5)
BUN SERPL-MCNC: 13 MG/DL (ref 8–22)
CALCIUM SERPL-MCNC: 7.4 MG/DL (ref 8.5–10.5)
CHLORIDE SERPL-SCNC: 109 MMOL/L (ref 96–112)
CO2 SERPL-SCNC: 21 MMOL/L (ref 20–33)
CREAT SERPL-MCNC: 0.75 MG/DL (ref 0.5–1.4)
EOSINOPHIL # BLD AUTO: 0 K/UL (ref 0–0.51)
EOSINOPHIL # BLD AUTO: 0 K/UL (ref 0–0.51)
EOSINOPHIL NFR BLD: 0 % (ref 0–6.9)
EOSINOPHIL NFR BLD: 0 % (ref 0–6.9)
ERYTHROCYTE [DISTWIDTH] IN BLOOD BY AUTOMATED COUNT: 48.5 FL (ref 35.9–50)
ERYTHROCYTE [DISTWIDTH] IN BLOOD BY AUTOMATED COUNT: 48.9 FL (ref 35.9–50)
GFR SERPLBLD CREATININE-BSD FMLA CKD-EPI: 100 ML/MIN/1.73 M 2
GLOBULIN SER CALC-MCNC: 1.8 G/DL (ref 1.9–3.5)
GLUCOSE BLD STRIP.AUTO-MCNC: 122 MG/DL (ref 65–99)
GLUCOSE SERPL-MCNC: 170 MG/DL (ref 65–99)
HCT VFR BLD AUTO: 27.3 % (ref 42–52)
HCT VFR BLD AUTO: 28.1 % (ref 42–52)
HGB BLD-MCNC: 10.6 G/DL (ref 14–18)
HGB BLD-MCNC: 9.3 G/DL (ref 14–18)
HGB BLD-MCNC: 9.6 G/DL (ref 14–18)
IMM GRANULOCYTES # BLD AUTO: 0.03 K/UL (ref 0–0.11)
IMM GRANULOCYTES # BLD AUTO: 0.04 K/UL (ref 0–0.11)
IMM GRANULOCYTES NFR BLD AUTO: 0.3 % (ref 0–0.9)
IMM GRANULOCYTES NFR BLD AUTO: 0.4 % (ref 0–0.9)
LYMPHOCYTES # BLD AUTO: 0.64 K/UL (ref 1–4.8)
LYMPHOCYTES # BLD AUTO: 0.88 K/UL (ref 1–4.8)
LYMPHOCYTES NFR BLD: 6.3 % (ref 22–41)
LYMPHOCYTES NFR BLD: 8.8 % (ref 22–41)
MAGNESIUM SERPL-MCNC: 1.9 MG/DL (ref 1.5–2.5)
MCH RBC QN AUTO: 33 PG (ref 27–33)
MCH RBC QN AUTO: 33.1 PG (ref 27–33)
MCHC RBC AUTO-ENTMCNC: 34.1 G/DL (ref 33.7–35.3)
MCHC RBC AUTO-ENTMCNC: 34.2 G/DL (ref 33.7–35.3)
MCV RBC AUTO: 96.6 FL (ref 81.4–97.8)
MCV RBC AUTO: 97.2 FL (ref 81.4–97.8)
MONOCYTES # BLD AUTO: 0.76 K/UL (ref 0–0.85)
MONOCYTES # BLD AUTO: 0.76 K/UL (ref 0–0.85)
MONOCYTES NFR BLD AUTO: 7.5 % (ref 0–13.4)
MONOCYTES NFR BLD AUTO: 7.6 % (ref 0–13.4)
NEUTROPHILS # BLD AUTO: 8.33 K/UL (ref 1.82–7.42)
NEUTROPHILS # BLD AUTO: 8.64 K/UL (ref 1.82–7.42)
NEUTROPHILS NFR BLD: 83.2 % (ref 44–72)
NEUTROPHILS NFR BLD: 85.6 % (ref 44–72)
NRBC # BLD AUTO: 0 K/UL
NRBC # BLD AUTO: 0 K/UL
NRBC BLD-RTO: 0 /100 WBC
NRBC BLD-RTO: 0 /100 WBC
PHOSPHATE SERPL-MCNC: 2.8 MG/DL (ref 2.5–4.5)
PLATELET # BLD AUTO: 100 K/UL (ref 164–446)
PLATELET # BLD AUTO: 105 K/UL (ref 164–446)
PMV BLD AUTO: 10.2 FL (ref 9–12.9)
PMV BLD AUTO: 10.5 FL (ref 9–12.9)
POTASSIUM SERPL-SCNC: 4.4 MMOL/L (ref 3.6–5.5)
PROT SERPL-MCNC: 4.8 G/DL (ref 6–8.2)
RBC # BLD AUTO: 2.81 M/UL (ref 4.7–6.1)
RBC # BLD AUTO: 2.91 M/UL (ref 4.7–6.1)
SODIUM SERPL-SCNC: 138 MMOL/L (ref 135–145)
WBC # BLD AUTO: 10 K/UL (ref 4.8–10.8)
WBC # BLD AUTO: 10.1 K/UL (ref 4.8–10.8)

## 2022-08-18 PROCEDURE — 700105 HCHG RX REV CODE 258: Performed by: SURGERY

## 2022-08-18 PROCEDURE — A9270 NON-COVERED ITEM OR SERVICE: HCPCS | Performed by: SURGERY

## 2022-08-18 PROCEDURE — 700117 HCHG RX CONTRAST REV CODE 255: Performed by: SURGERY

## 2022-08-18 PROCEDURE — 700111 HCHG RX REV CODE 636 W/ 250 OVERRIDE (IP): Performed by: SURGERY

## 2022-08-18 PROCEDURE — 94669 MECHANICAL CHEST WALL OSCILL: CPT

## 2022-08-18 PROCEDURE — 82962 GLUCOSE BLOOD TEST: CPT

## 2022-08-18 PROCEDURE — 73700 CT LOWER EXTREMITY W/O DYE: CPT | Mod: LT

## 2022-08-18 PROCEDURE — 37252 INTRVASC US NONCORONARY 1ST: CPT | Performed by: SURGERY

## 2022-08-18 PROCEDURE — 73610 X-RAY EXAM OF ANKLE: CPT | Mod: RT

## 2022-08-18 PROCEDURE — 99291 CRITICAL CARE FIRST HOUR: CPT | Performed by: SURGERY

## 2022-08-18 PROCEDURE — C1876 STENT, NON-COA/NON-COV W/DEL: HCPCS

## 2022-08-18 PROCEDURE — 770022 HCHG ROOM/CARE - ICU (200)

## 2022-08-18 PROCEDURE — 80053 COMPREHEN METABOLIC PANEL: CPT

## 2022-08-18 PROCEDURE — 700102 HCHG RX REV CODE 250 W/ 637 OVERRIDE(OP): Performed by: SPECIALIST

## 2022-08-18 PROCEDURE — 03743DZ DILATION OF LEFT SUBCLAVIAN ARTERY WITH INTRALUMINAL DEVICE, PERCUTANEOUS APPROACH: ICD-10-PCS | Performed by: SURGERY

## 2022-08-18 PROCEDURE — 700111 HCHG RX REV CODE 636 W/ 250 OVERRIDE (IP): Performed by: ORTHOPAEDIC SURGERY

## 2022-08-18 PROCEDURE — 83735 ASSAY OF MAGNESIUM: CPT

## 2022-08-18 PROCEDURE — A9270 NON-COVERED ITEM OR SERVICE: HCPCS | Performed by: NURSE PRACTITIONER

## 2022-08-18 PROCEDURE — A9270 NON-COVERED ITEM OR SERVICE: HCPCS | Performed by: SPECIALIST

## 2022-08-18 PROCEDURE — 700102 HCHG RX REV CODE 250 W/ 637 OVERRIDE(OP): Performed by: NURSE PRACTITIONER

## 2022-08-18 PROCEDURE — 71045 X-RAY EXAM CHEST 1 VIEW: CPT

## 2022-08-18 PROCEDURE — 700102 HCHG RX REV CODE 250 W/ 637 OVERRIDE(OP): Performed by: SURGERY

## 2022-08-18 PROCEDURE — 85025 COMPLETE CBC W/AUTO DIFF WBC: CPT | Mod: 91

## 2022-08-18 PROCEDURE — 84100 ASSAY OF PHOSPHORUS: CPT

## 2022-08-18 PROCEDURE — 37238 OPEN/PERQ PLACE STENT SAME: CPT | Mod: 79,LT | Performed by: SURGERY

## 2022-08-18 RX ORDER — HYDRALAZINE HYDROCHLORIDE 20 MG/ML
INJECTION INTRAMUSCULAR; INTRAVENOUS
Status: DISCONTINUED
Start: 2022-08-18 | End: 2022-08-19 | Stop reason: HOSPADM

## 2022-08-18 RX ORDER — TAMSULOSIN HYDROCHLORIDE 0.4 MG/1
0.4 CAPSULE ORAL
Status: DISCONTINUED | OUTPATIENT
Start: 2022-08-18 | End: 2022-08-30 | Stop reason: HOSPADM

## 2022-08-18 RX ORDER — MIDAZOLAM HYDROCHLORIDE 1 MG/ML
INJECTION INTRAMUSCULAR; INTRAVENOUS
Status: DISCONTINUED
Start: 2022-08-18 | End: 2022-08-19 | Stop reason: HOSPADM

## 2022-08-18 RX ORDER — PROTAMINE SULFATE 10 MG/ML
40 INJECTION, SOLUTION INTRAVENOUS ONCE
Status: COMPLETED | OUTPATIENT
Start: 2022-08-18 | End: 2022-08-18

## 2022-08-18 RX ORDER — HEPARIN SODIUM 1000 [USP'U]/ML
6000 INJECTION, SOLUTION INTRAVENOUS; SUBCUTANEOUS ONCE
Status: COMPLETED | OUTPATIENT
Start: 2022-08-18 | End: 2022-08-18

## 2022-08-18 RX ORDER — ENOXAPARIN SODIUM 100 MG/ML
30 INJECTION SUBCUTANEOUS EVERY 12 HOURS
Status: DISCONTINUED | OUTPATIENT
Start: 2022-08-18 | End: 2022-08-30 | Stop reason: HOSPADM

## 2022-08-18 RX ORDER — MIDAZOLAM HYDROCHLORIDE 1 MG/ML
.5-2 INJECTION INTRAMUSCULAR; INTRAVENOUS PRN
Status: DISCONTINUED | OUTPATIENT
Start: 2022-08-18 | End: 2022-08-18

## 2022-08-18 RX ORDER — ACETAMINOPHEN 325 MG/1
650 TABLET ORAL 4 TIMES DAILY
Status: DISCONTINUED | OUTPATIENT
Start: 2022-08-18 | End: 2022-08-20

## 2022-08-18 RX ORDER — HEPARIN SODIUM 1000 [USP'U]/ML
INJECTION, SOLUTION INTRAVENOUS; SUBCUTANEOUS
Status: DISCONTINUED
Start: 2022-08-18 | End: 2022-08-19 | Stop reason: HOSPADM

## 2022-08-18 RX ORDER — ONDANSETRON 2 MG/ML
4 INJECTION INTRAMUSCULAR; INTRAVENOUS PRN
Status: ACTIVE | OUTPATIENT
Start: 2022-08-18 | End: 2022-08-18

## 2022-08-18 RX ORDER — PROTAMINE SULFATE 10 MG/ML
INJECTION, SOLUTION INTRAVENOUS
Status: DISCONTINUED
Start: 2022-08-18 | End: 2022-08-19 | Stop reason: HOSPADM

## 2022-08-18 RX ORDER — IODIXANOL 270 MG/ML
35 INJECTION, SOLUTION INTRAVASCULAR ONCE
Status: COMPLETED | OUTPATIENT
Start: 2022-08-18 | End: 2022-08-18

## 2022-08-18 RX ORDER — SODIUM CHLORIDE 9 MG/ML
500 INJECTION, SOLUTION INTRAVENOUS
Status: ACTIVE | OUTPATIENT
Start: 2022-08-18 | End: 2022-08-18

## 2022-08-18 RX ORDER — OXYCODONE HYDROCHLORIDE 5 MG/1
5 TABLET ORAL
Status: DISCONTINUED | OUTPATIENT
Start: 2022-08-18 | End: 2022-08-30 | Stop reason: HOSPADM

## 2022-08-18 RX ORDER — OXYCODONE HYDROCHLORIDE 10 MG/1
10 TABLET ORAL
Status: DISCONTINUED | OUTPATIENT
Start: 2022-08-18 | End: 2022-08-30 | Stop reason: HOSPADM

## 2022-08-18 RX ADMIN — ENOXAPARIN SODIUM 30 MG: 30 INJECTION SUBCUTANEOUS at 20:02

## 2022-08-18 RX ADMIN — ACETAMINOPHEN 650 MG: 325 TABLET, FILM COATED ORAL at 21:01

## 2022-08-18 RX ADMIN — MIDAZOLAM HYDROCHLORIDE 0.5 MG: 1 INJECTION, SOLUTION INTRAMUSCULAR; INTRAVENOUS at 17:46

## 2022-08-18 RX ADMIN — OXYCODONE HYDROCHLORIDE 10 MG: 10 TABLET ORAL at 21:01

## 2022-08-18 RX ADMIN — MORPHINE SULFATE 4 MG: 4 INJECTION INTRAVENOUS at 07:49

## 2022-08-18 RX ADMIN — FENTANYL CITRATE 25 MCG: 50 INJECTION, SOLUTION INTRAMUSCULAR; INTRAVENOUS at 17:46

## 2022-08-18 RX ADMIN — CEFAZOLIN SODIUM 2 G: 2 INJECTION, SOLUTION INTRAVENOUS at 07:51

## 2022-08-18 RX ADMIN — MORPHINE SULFATE 4 MG: 4 INJECTION INTRAVENOUS at 06:34

## 2022-08-18 RX ADMIN — METAXALONE 400 MG: 800 TABLET ORAL at 12:01

## 2022-08-18 RX ADMIN — OXYCODONE HYDROCHLORIDE 10 MG: 10 TABLET ORAL at 13:46

## 2022-08-18 RX ADMIN — SODIUM CHLORIDE, POTASSIUM CHLORIDE, SODIUM LACTATE AND CALCIUM CHLORIDE: 600; 310; 30; 20 INJECTION, SOLUTION INTRAVENOUS at 21:07

## 2022-08-18 RX ADMIN — MORPHINE SULFATE 4 MG: 4 INJECTION INTRAVENOUS at 21:00

## 2022-08-18 RX ADMIN — SODIUM CHLORIDE, POTASSIUM CHLORIDE, SODIUM LACTATE AND CALCIUM CHLORIDE: 600; 310; 30; 20 INJECTION, SOLUTION INTRAVENOUS at 08:46

## 2022-08-18 RX ADMIN — METAXALONE 400 MG: 800 TABLET ORAL at 05:19

## 2022-08-18 RX ADMIN — GABAPENTIN 100 MG: 100 CAPSULE ORAL at 05:19

## 2022-08-18 RX ADMIN — TAMSULOSIN HYDROCHLORIDE 0.4 MG: 0.4 CAPSULE ORAL at 09:38

## 2022-08-18 RX ADMIN — HEPARIN SODIUM 6000 UNITS: 1000 INJECTION, SOLUTION INTRAVENOUS; SUBCUTANEOUS at 17:26

## 2022-08-18 RX ADMIN — POLYETHYLENE GLYCOL 3350 1 PACKET: 17 POWDER, FOR SOLUTION ORAL at 05:29

## 2022-08-18 RX ADMIN — MORPHINE SULFATE 4 MG: 4 INJECTION INTRAVENOUS at 20:03

## 2022-08-18 RX ADMIN — PROTAMINE SULFATE 40 MG: 10 INJECTION, SOLUTION INTRAVENOUS at 17:53

## 2022-08-18 RX ADMIN — DOCUSATE SODIUM 50 MG AND SENNOSIDES 8.6 MG 1 TABLET: 8.6; 5 TABLET, FILM COATED ORAL at 21:01

## 2022-08-18 RX ADMIN — FENTANYL CITRATE 25 MCG: 50 INJECTION, SOLUTION INTRAMUSCULAR; INTRAVENOUS at 17:19

## 2022-08-18 RX ADMIN — MORPHINE SULFATE 4 MG: 4 INJECTION INTRAVENOUS at 18:41

## 2022-08-18 RX ADMIN — MAGNESIUM HYDROXIDE 30 ML: 400 SUSPENSION ORAL at 05:29

## 2022-08-18 RX ADMIN — DOCUSATE SODIUM 100 MG: 100 CAPSULE, LIQUID FILLED ORAL at 05:29

## 2022-08-18 RX ADMIN — MORPHINE SULFATE 4 MG: 4 INJECTION INTRAVENOUS at 08:52

## 2022-08-18 RX ADMIN — OXYCODONE 5 MG: 5 TABLET ORAL at 09:37

## 2022-08-18 RX ADMIN — GABAPENTIN 100 MG: 100 CAPSULE ORAL at 12:01

## 2022-08-18 RX ADMIN — MORPHINE SULFATE 4 MG: 4 INJECTION INTRAVENOUS at 03:43

## 2022-08-18 RX ADMIN — MIDAZOLAM HYDROCHLORIDE 1 MG: 1 INJECTION, SOLUTION INTRAMUSCULAR; INTRAVENOUS at 17:19

## 2022-08-18 RX ADMIN — IODIXANOL 35 ML: 270 INJECTION, SOLUTION INTRAVASCULAR at 17:45

## 2022-08-18 RX ADMIN — FAMOTIDINE 20 MG: 10 INJECTION, SOLUTION INTRAVENOUS at 05:27

## 2022-08-18 ASSESSMENT — ENCOUNTER SYMPTOMS
HEADACHES: 0
SENSORY CHANGE: 1
DOUBLE VISION: 0
FEVER: 0
CHILLS: 0
MYALGIAS: 1
TREMORS: 0
WEAKNESS: 0
NAUSEA: 0
SHORTNESS OF BREATH: 0
SPEECH CHANGE: 0
PALPITATIONS: 0
COUGH: 1
VOMITING: 0
ABDOMINAL PAIN: 0

## 2022-08-18 ASSESSMENT — PAIN DESCRIPTION - PAIN TYPE
TYPE: ACUTE PAIN
TYPE: ACUTE PAIN;SURGICAL PAIN
TYPE: ACUTE PAIN

## 2022-08-18 ASSESSMENT — PATIENT HEALTH QUESTIONNAIRE - PHQ9
1. LITTLE INTEREST OR PLEASURE IN DOING THINGS: NOT AT ALL
2. FEELING DOWN, DEPRESSED, IRRITABLE, OR HOPELESS: NOT AT ALL
SUM OF ALL RESPONSES TO PHQ9 QUESTIONS 1 AND 2: 0

## 2022-08-18 ASSESSMENT — FIBROSIS 4 INDEX: FIB4 SCORE: 10.67

## 2022-08-18 NOTE — PROGRESS NOTES
ACUTE CARE VASCULAR SERVICE  PROGRESS NOTE    Patient did well overnight    However the left upper extremity demonstrates intermittent numbness and discomfort.  This is particularly noticeable in the hand and could be at least partially related to the coverage of the left subclavian artery with the aortic stent.  I did measure a systolic pressure of 60 in the left brachial artery and the Doppler signal sounds fairly brisk however down at the wrist the Doppler signals are weak and we are not able to get a pulse oximetry reading on the fingers of the left hand.  Currently motor and sensory exam is intact.    All things considered I think the best option at this point would be to stent the left subclavian artery to optimize perfusion of the left upper extremity.  This can be done through a left brachial artery access.  I had a detailed discussion with the patient regarding the nature of the situation and the recommendation to place a stent to improve blood flow.  Questions were answered and he agreed to proceed.    Gilbert Lawrence MD  [facility]  [place] preferred. Otherwise text to cell [phone] or call my office [phone]  __________________________________________________________________  Patient:Ezekiel Sanz   MRN:[id number]   CSN:[id number]

## 2022-08-18 NOTE — ANESTHESIA TIME REPORT
Anesthesia Start and Stop Event Times     Date Time Event    8/17/2022 1745 Ready for Procedure     1755 Anesthesia Start     2032 Anesthesia Stop        Responsible Staff  08/17/22    Name Role Begin End    Zaki Cuadra M.D. Anesth 1755 1828    Tomás Bailey M.D. Anesth 1828 2032        Overtime Reason:  no overtime (within assigned shift)    Comments:

## 2022-08-18 NOTE — PROCEDURES
Procedure Report    Procedure: Arterial line placement    Surgeon: Sharon Bunn MD    Diagnosis: Trauma     Indications: Aortic injury    Procedure in detail: The patient's  right wrist was sterilely prepped and draped. The ultrasound was used to locate the vascular structures and   a right  radial arterial line was placed using the modified seldinger technique.  The catheter was secured to the skin with silk suture and a pressure line was attached to the catheter with good waveform on the monitor.  Sterile dressings were applied. The patient tolerated the procedure well.      Sharon Bunn MD  [facility]  [phone]
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
## 2022-08-18 NOTE — ASSESSMENT & PLAN NOTE
Mildly displaced right lateral 5th rib fracture.  Displaced left 12th rib fracture. Fractures of anterolateral left 3rd-7th ribs.  Aggressive pulmonary hygiene and multimodal pain management and serial chest radiography.   8/21 Chest x-ray with increased opacities, continue aggressive pulmonary hygiene  - Transition to high flow  8/22 Continue diuresis    8/24 CT chest with no pulmonary embolism.  Small to moderate left effusion. Density consistent with serous nature.  Chest tube deferred. Serial CXR  8/26 Thoracentesis not performed due to low volume of effusion.  Trend CXR imaging.

## 2022-08-18 NOTE — OP REPORT
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 with periaortic hematoma    Procedure:  - Endovascular repair of descending thoracic aortic injury with [place] thoracic aortic endograft 34 mm x 10 cm  - Nonselective catheter placement in the thoracic aorta  - thoracic aortogram  -Right common femoral artery exposure  -Percutaneous access of the left common femoral artery with ultrasound guidance    -------------------------------------------------------------------------------------------------    Surgeon:   Gilbert Lawrence MD    Co-Surgeon:   None    Assistant:   None    Anesthesia:   ROBERT    IVF:    Per anesthesia record    EBL:    Minimal    Blood Products:  none    Heparin:   Systemically heparinized, 7000 units    Contrast Dose:  30 mL    Findings:   Final stent deployment encroaches on the subclavian origin.  Blood pressure still measured approximately 70 systolic in the left arm (which is approximately 50 points lower than the right arm) and pulse oximeter on the left index finger was still able to read 98% with a good waveform.    Devices used:   [place] thoracic aortic endograft 34 mm x 10 cm    Complications:   none    Disposition:   Returned to ICU in guarded condition    -----------------------------------------------------------------------------------------------------      Procedure Summary:  Patient was placed supine on the OR table and GETA was administered. Abdomen and groins were prepped and draped in the usual fashion. Timeout was called to  identify the correct patient, procedure, and equipment, and everyone was in agreement. Patient did receive preoperative prophylactic antibiotics.    Oblique incisions were made in the right groin and carried down to the femoral sheath using cautery and clips as needed to assure hemostasis.  The common femoral artery was identified and encircled proximally and distally with vessel loops.  The patient was systemically heparinized and this was maintained until the sheaths were removed.  The right common femoral artery was accessed and a glidewire was passed into the aorta followed by a 6fr sheath.  Omniflush catheter was advanced, then a lunderquist wire was passed and the omniflush removed.  A 22 French Eagle dry seal sheath was placed up the right side without any difficulty.    At this point we performed ultrasound-guided percutaneous access of the left common femoral artery and we placed a 6 French sheath.  We advanced a Cantrell wire and an Omni Flush catheter to the aortic arch.  We performed an aortic arch angiogram which identified the origins of the arch vessels.  The Eagle thoracic graft was advanced up to the subclavian artery origin and it was deployed with intent to partially encroach on the left subclavian origin to maximize proximal seal zone relative to the injury.  Completion angiogram showed sluggish flow through the left subclavian artery although there does appear to be some antegrade flow getting into the left subclavian artery.  Blood pressure cuff on that side showed a systolic pressure of 70 which was about 50 points lower than the systemic pressure.  Pulse oximeter on the left index finger showed 98% with a good waveform.  The hand appears to be adequately perfused despite the subclavian artery encroachment and will be followed clinically for now.    At this point the left femoral 6 French sheath was removed and the site was closed with a 6 French Angio-Seal closure device which deployed  successfully.  The right femoral sheath was removed and the arteriotomy was closed with interrupted 5-0 Prolene sutures.  The arteriotomy was flushed and irrigated prior to closure and this verified good antegrade and retrograde bleeding with no evidence of thrombosis.  Once closed the clamps were removed and there was a strong pulse distal to the site of repair.  At the end of the case I did check pedal pulses and the dorsalis pedis was found to be palpable bilaterally.  Topical hemostatic was applied and the heparin was reversed with protamine.    The wires and sheaths were removed and the arteriotomies closed with interrupted prolene sutures.  Once reperfused, both feet had palpable pulses.    The incisions were closed with multiple layers of absorbable sutures and skin glue.    All counts were correct. Patient tolerated the procedure well.  The surgery was turned over to the orthopedic team for repair of a left leg fracture.    Gilbert Lawrence MD  General and Vascular Surgery  [facility]  [phone]  Cell: [phone]

## 2022-08-18 NOTE — ASSESSMENT & PLAN NOTE
Prophylactic anticoagulation for thrombotic prevention initially contraindicated secondary to elevated bleeding risk..  8/18 Prophylactic Lovenox initiated.  8/22 Lovenox held due to need for transfusion.  8/23 Prophylactic Lovenox resumed.

## 2022-08-18 NOTE — OR NURSING
2027: Pt arrived from OR on bed on 8L O2 via simple mask. Pt placed on monitor. VSS. Pt with incision to R groin, open to air, C/D/I. Pt with ex-fix appliance on LLE. @ open wounds to L thigh and L ankle dressed with gauze/tegaderm, bleeding through.    2045: Dressings to L thigh and L ankle reinforced. Ice pack to LLE.     2100: CXR done for line placement. Pt came out from OR with IV fluids infusing in central line.     2110: Pt medicated for pain.    2120: Report called to KARMA Schwab.    2142: Pt taken to SICU on monitor and O2 with RN.
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
## 2022-08-18 NOTE — PROGRESS NOTES
"   Orthopaedic Progress Note    Interval changes:  Patient doing well post op   Dressings saturated with sanguinous-ok for nursing to change PRN for saturation    ROS - Patient denies any new issues.  Pain well controlled.    /63   Pulse 86   Temp 36.4 °C (97.5 °F) (Bladder)   Resp (!) 21   Ht 1.753 m (5' 9\")   Wt 93.5 kg (206 lb 2.1 oz)   SpO2 98%       Patient seen and examined  No acute distress  Breathing non labored  RRR  LLE proximal I&D dressings with sanguinous saturation. Distal LLE dressing saturated, ex fix in place without loosening, DVNI, moves all toes, cap refill <2 sec.     Recent Labs     08/17/22  1622 08/17/22  2350 08/18/22  0520 08/18/22  0948   WBC 14.4*  --  10.1 10.0   RBC 4.43*  --  2.91* 2.81*   HEMOGLOBIN 14.7 10.6* 9.6* 9.3*   HEMATOCRIT 42.8  --  28.1* 27.3*   MCV 96.6  --  96.6 97.2   MCH 33.2*  --  33.0 33.1*   MCHC 34.3  --  34.2 34.1   RDW 48.0  --  48.5 48.9   PLATELETCT 149*  --  105* 100*   MPV 11.1  --  10.5 10.2       Active Hospital Problems    Diagnosis     Type III open fracture of left tibia and fibula [S82.202C, S82.402C]      Priority: High    Fracture of ribs, seven, left, closed, initial encounter [S22.42XA]      Priority: High    Traumatic tear of thoracic aorta [S25.01XA]      Priority: High    Contraindication to deep vein thrombosis (DVT) prophylaxis [Z53.09]      Priority: Medium    Penetrating injury of left lower extremity [S81.832A]      Priority: Medium    Closed fracture of transverse process of lumbar vertebra (HCC) [S32.009A]      Priority: Medium    Closed fracture of posterior wall of left acetabulum (HCC) [S32.422A]      Priority: Medium    Free fluid in pelvis [R18.8]      Priority: Medium    Humeral head fracture [S42.293A]      Priority: Medium    Benign prostatic hyperplasia without lower urinary tract symptoms [N40.0]      Priority: Medium    Trauma [T14.90XA]      Priority: Low    Left arm swelling [M79.89]      Priority: Low "       Assessment/Plan:  Patient doing well post op   Dressings saturated with sanguinous-ok for nursing to change PRN for saturation  POD#1 S/P   1.  Irrigation debridement open fracture   2.  External fixation left leg   3.  Exploration left thigh penetrating wound  Wt bearing status - NWB LLE  Wound care/Drains - Dressings to be changed every other day by nursing  Future Procedures - pending definitive fixation once soft tissue edema improved  Sutures/Staples out- 14 days post operatively  PT/OT-initiated  Antibiotics: ancef 2g IV Q8  DVT Prophylaxis- TEDS/SCDs/Foot pumps  Levy-none  Case Coordination for Discharge Planning - Disposition pending final therapy recs

## 2022-08-18 NOTE — PROCEDURES
Procedure Report    Procedure: Central line placement    Surgeon: Sharon Bunn MD    Diagnosis: Trauma, Aortic injury    Indications: Need for access    Procedure in detail: Full barrier precautions were used for the procedure including cap, sterile gown, sterile gloves, and sterile full body drape.The patient's  right  superior anterior chest wall  was prepped and draped in the standard sterile fashion. Lidocaine was injected in the skin and subcutaneous tissues for anesthesia.  The  right subclavian vein was accessed with a needle. The modified seldinger technique was used to place a 7Fr 20cm triple lumen catheter.   The catheter was secured to the skin with silk suture.   Sterile dressings were applied, including a biopatch. The patient tolerated the procedure well.  A chest x-ray was ordered for verification.     Sharon Bunn MD

## 2022-08-18 NOTE — ANESTHESIA PROCEDURE NOTES
Airway    Date/Time: 8/17/2022 6:00 PM  Performed by: Zaki Cuadra M.D.  Authorized by: Zaki Cuadra M.D.     Location:  OR  Urgency:  Elective  Indications for Airway Management:  Anesthesia      Spontaneous Ventilation: absent    Sedation Level:  Deep  Preoxygenated: Yes    Patient Position:  Sniffing  Final Airway Type:  Endotracheal airway  Final Endotracheal Airway:  ETT  Cuffed: Yes    Technique Used for Successful ETT Placement:  Video laryngoscopy    Insertion Site:  Oral  Blade Type:  Anthony  Laryngoscope Blade/Videolaryngoscope Blade Size:  4  ETT Size (mm):  8.5  Measured from:  Teeth  ETT to Teeth (cm):  24  Placement Verified by: auscultation and capnometry    Cormack-Lehane Classification:  Grade I - full view of glottis  Number of Attempts at Approach:  1
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
## 2022-08-18 NOTE — CARE PLAN
The patient is Watcher - Medium risk of patient condition declining or worsening    Shift Goals  Clinical Goals: hemodynamic stability, pulse check  Patient Goals: pain control  Family Goals: updates    Progress made toward(s) clinical / shift goals:    Problem: Pain - Standard  Goal: Alleviation of pain or a reduction in pain to the patient’s comfort goal  Outcome: Progressing     Problem: Knowledge Deficit - Standard  Goal: Patient and family/care givers will demonstrate understanding of plan of care, disease process/condition, diagnostic tests and medications  Outcome: Progressing     Problem: Skin Integrity  Goal: Skin integrity is maintained or improved  Outcome: Progressing     Problem: Fall Risk  Goal: Patient will remain free from falls  Outcome: Progressing

## 2022-08-18 NOTE — ASSESSMENT & PLAN NOTE
Aortic traumatic injury involving the distal arch/proximal descending aorta with a pseudoaneurysm and a periaortic hematoma. Hemorrhage tracks along the left common carotid and subclavian arteries at the thoracic inlet.  8/17 Endovascular repair of descending thoracic aortic injury.   8/18 Numbness in left hand and markedly different blood pressures in left and right arms - Return to OR for subclavian stent  8/25 Begin daily ASA.  Continue Lovenox.  DC home on ASA and Plavix   Gilbert Lawrence MD. Vascular Surgery.

## 2022-08-18 NOTE — PROGRESS NOTES
ACUTE CARE VASCULAR SERVICE  PROGRESS NOTE  Traumatic thoracic aortic tear  OR emergently for stent graft repair, 34mm by 10cm landing at the left subclavian should cover the injury    Gilbert Lawrence MD  [facility]  Voalte preferred. Otherwise text to cell [phone] or call my office [phone]  __________________________________________________________________  Patient:Jeremy Twenty-Four   MRN:[id number]   CSN:[id number]

## 2022-08-18 NOTE — PROGRESS NOTES
Patient arrived to S126 with ACLS RN on continuous monitoring. Dr. Bunn placing central line and arterial line. Unable to perform 4 eyes skin assessment at this time.
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
## 2022-08-18 NOTE — ASSESSMENT & PLAN NOTE
Articular fracture left humeral head versus AVN on CT.  Diagnostic imaging with no evidence of fracture or dislocation.  Weight bearing status - Weightbearing as tolerated COLTON Pelaez MD. Orthopedic Surgeon. [facility].

## 2022-08-18 NOTE — ASSESSMENT & PLAN NOTE
Fracture of the posterior left acetabular wall.  Non-operative management.  Weight bearing status - Nonweightbearing LLE.  Onofre Pelaez MD. Orthopedic Surgeon. [facility].
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
## 2022-08-18 NOTE — CARE PLAN
The patient is Watcher - Medium risk of patient condition declining or worsening    Shift Goals  Clinical Goals: pain control, hemodynamic stability  Patient Goals: pain control  Family Goals: updates    Progress made toward(s) clinical / shift goals:    Problem: Pain - Standard  Goal: Alleviation of pain or a reduction in pain to the patient’s comfort goal  Outcome: Progressing     Problem: Knowledge Deficit - Standard  Goal: Patient and family/care givers will demonstrate understanding of plan of care, disease process/condition, diagnostic tests and medications  Outcome: Progressing     Problem: Skin Integrity  Goal: Skin integrity is maintained or improved  Outcome: Progressing     Problem: Fall Risk  Goal: Patient will remain free from falls  Outcome: Progressing       Patient is not progressing towards the following goals:

## 2022-08-18 NOTE — PROGRESS NOTES
Pt arrives to floor with OR team at 2145. Primary RN Josselin assumes pt care. Continuous monitoring in place.     Temp: 97.5 F  HR: 91  RR: 14  BP: 135/61  SpO2: 97 on 6 L oxymask  Weight: 93.3 kg    Discussed call light/phone system, communication board and POC. Pt is AAO  x 4 and drowsy. Fall precautions in place. Bed alarm on. Bed is locked and in low position. Treaded slippers in place.     Pt belongings:      2 RN skin check complete with Josselin, primary RN    Current impaired skin areas:    - abrasion nose  - laceration to L cheek  - redness to R cheek  - bruising, redness to chest and abdomen  - R groin surgical site CDI  - R hand skin tear  - R forearm skin tear  - L handlaceration to   - L forearm skin tear  - RLE scattered abrasions, bruising  - L posterior thigh surgical dressing, with sanguineous drainage - reinforced  - L lower leg with external fixator in place, pin sites clean with sanguineous drainage  - L ankle with surgical dressings with sanguineous drainage - reinforced    Devices in place:      Devices assessed and repositioned PRN.     The following preventative measures and interventions in place: Q2 turns in place, heels/elbows floated on pillow, mepilex placed to sacrum, surgical dressings observed and reinforced appropriately    Wound consult placedYES/NO: N/A

## 2022-08-18 NOTE — PROGRESS NOTES
"      Mental status adequate for full examination?: Yes    Spine cleared (radiologically and/or clinically): Yes    REVIEW OF SYSTEMS:  Review of Systems   Constitutional:  Negative for chills and fever.   Eyes:  Negative for double vision.   Respiratory:  Positive for cough. Negative for shortness of breath.    Cardiovascular:  Negative for palpitations.   Gastrointestinal:  Negative for abdominal pain, nausea and vomiting.   Genitourinary:  Positive for urgency (history of BPH).   Musculoskeletal:  Positive for myalgias (chest wall pain).   Neurological:  Positive for sensory change (left arm). Negative for tremors, speech change, weakness and headaches.     PHYSICAL EXAMINATION:  Blood Pressure 128/61   Pulse 73   Temperature 36.4 °C (97.5 °F) (Bladder)   Respiration 14   Height 1.753 m (5' 9\")   Weight 93.5 kg (206 lb 2.1 oz)   Oxygen Saturation 97%   Body Mass Index 30.44 kg/m²   Physical Exam  Vitals and nursing note reviewed.   Constitutional:       Appearance: He is not toxic-appearing.   HENT:      Right Ear: External ear normal.      Left Ear: External ear normal.      Nose:      Comments: Nasal bridge swelling     Mouth/Throat:      Mouth: Mucous membranes are moist.   Eyes:      Conjunctiva/sclera: Conjunctivae normal.      Pupils: Pupils are equal, round, and reactive to light.   Cardiovascular:      Rate and Rhythm: Normal rate and regular rhythm.      Comments: Doppler left radial pulse  Pulmonary:      Effort: Pulmonary effort is normal. No respiratory distress.      Breath sounds: Examination of the right-lower field reveals decreased breath sounds. Examination of the left-lower field reveals decreased breath sounds. Decreased breath sounds present.   Chest:      Chest wall: Tenderness present.   Abdominal:      General: There is no distension.      Palpations: Abdomen is soft.      Tenderness: There is no abdominal tenderness. There is no guarding.   Genitourinary:     Comments: Mildred in " place with yellow urine  Musculoskeletal:         General: Swelling, tenderness and signs of injury present.      Cervical back: No tenderness.      Comments: External fixator in place to left leg, minimal bleeding from pin sites  Left thigh dressing in place   Right ankle ecchymosis and edema    Skin:     General: Skin is warm.      Capillary Refill: Capillary refill takes less than 2 seconds.      Comments: Multiple abrasions to upper extremities     Neurological:      Mental Status: He is alert and oriented to person, place, and time.   Psychiatric:         Behavior: Behavior normal.       LABORATORY VALUES:  Recent Labs     08/17/22  1622 08/17/22  2350 08/18/22  0520   WBC 14.4*  --  10.1   RBC 4.43*  --  2.91*   HEMOGLOBIN 14.7 10.6* 9.6*   HEMATOCRIT 42.8  --  28.1*   MCV 96.6  --  96.6   MCH 33.2*  --  33.0   MCHC 34.3  --  34.2   RDW 48.0  --  48.5   PLATELETCT 149*  --  105*   MPV 11.1  --  10.5     Recent Labs     08/17/22  1622 08/18/22  0520   SODIUM 138 138   POTASSIUM 4.1 4.4   CHLORIDE 108 109   CO2 20 21   GLUCOSE 133* 170*   BUN 15 13   CREATININE 1.00 0.75   CALCIUM 8.6 7.4*     Recent Labs     08/17/22  1622 08/18/22  0520   ASTSGOT 113* 158*   ALTSGPT 97* 84*   TBILIRUBIN 0.7 0.4   ALKPHOSPHAT 44 29*   GLOBULIN 2.6 1.8*   INR 1.07  --      Recent Labs     08/17/22 1622   APTT 24.9   INR 1.07       IMAGING:  DX-CHEST-PORTABLE (1 VIEW)   Final Result         1.  Pulmonary edema and/or infiltrates are identified, which are stable since the prior exam.   2.  Small right pleural effusion   3.  Right rib fractures   4.  Cardiomegaly      CT-ANKLE W/O PLUS RECONS LEFT   Final Result         1.  Comminuted distal tibial metadiaphyseal fracture extending into the ankle mortise joint.   2.  Oblique comminuted distal fibular diaphyseal fracture      DX-CHEST-LIMITED (1 VIEW)   Final Result         1.  Pulmonary edema and/or infiltrates are identified, which are stable since the prior exam.   2.  Small  right pleural effusion   3.  Right rib fractures   4.  Cardiomegaly      DX-ANKLE 2- VIEWS LEFT   Final Result         1.  Comminuted distal tibial metadiaphyseal fracture   2.  Oblique distal fibular diaphyseal fracture      CT-CTA LOWER EXT WITH & W/O-POST PROCESS LEFT   Final Result      1.  Penetrating injury to the mid to upper LEFT thigh posterolaterally with edema and hemorrhage in the subcutaneous soft tissues and underlying muscle.  Multiple foci of enhancement consistent with ongoing arterial hemorrhage likely from muscular    .   2.  Severely comminuted open fractures of the distal LEFT tibia and fibula.      These findings were electronically conveyed to and received by BRIAN COHN on 8/17/2022 5:47 PM.         CT-CHEST,ABDOMEN,PELVIS WITH   Final Result   Addendum (preliminary) 1 of 1   These findings were discussed with Dr Bunn on 8/17/2022 5:25 PM.      Final      1.  Traumatic tear of the proximal descending thoracic aorta with associated periaortic hemorrhage.   2.  Bilateral rib fractures as detailed above.   3.  Solid organs of the abdomen are intact.   4.  Small amount of fluid in the pelvis, concerning for hemoperitoneum and potential bowel injury.   5.  LEFT posterior acetabular fracture.   6.  Articular fracture LEFT humeral head versus AVN.   7.  Multiple liver cysts.      CT-TSPINE W/O PLUS RECONS   Final Result      1.  Aortic traumatic injury involving the distal arch/proximal descending aorta with a pseudoaneurysm and a periaortic hematoma. Hemorrhage tracks along the left common carotid and subclavian arteries at the thoracic inlet.   2.  Minimal loss of height of T1 and T3 is of indeterminate age but could be chronic.   3.  Fracture of the 12th rib on the left.   4.  Fracture of the transverse process of L2 on the left.   5.  Demineralization which limits evaluation.      CT-LSPINE W/O PLUS RECONS   Final Result      1.  Fracture of the transverse process of L2 on the  left.   2.  Fracture of the 12th rib.   3.  Degenerative changes particularly in the lower lumbar spine.   4.  Small amount of free fluid in the pelvis.   5.  Fracture of the posterior left acetabulum      CT-HEAD W/O   Final Result      Head CT without contrast within normal limits. No evidence of acute cerebral infarction, hemorrhage or mass lesion.         CT-CSPINE WITHOUT PLUS RECONS   Final Result      1.  No evidence of cervical spine fracture.      2.  Partial visualization of superior mediastinal hematoma.      DX-HAND 2- LEFT   Final Result      1.  Marked dorsal soft tissue swelling.   2.  No fracture or dislocation of LEFT hand.   3.  Mild osteoarthritis.      DX-FOREARM LEFT   Final Result      1.  No fracture of LEFT forearm.   2.  Dorsal soft tissue swelling at the wrist.      DX-PELVIS-1 OR 2 VIEWS   Final Result      Limited exam showing no pelvic fracture.      DX-TIBIA AND FIBULA LEFT   Final Result      Comminuted displaced fractures of the distal left tibia and fibula.      DX-CHEST-LIMITED (1 VIEW)   Final Result      1.  Limited exam showing hypoinflation and mild displaced lateral RIGHT 5th rib fracture.   2.  No pneumothorax.      US-ABORTED US PROCEDURE    (Results Pending)   DX-PORTABLE FLUOROSCOPY < 1 HOUR    (Results Pending)   IR-THORACIC AORTOGRAM    (Results Pending)   US-TRAUMA VEIN SCREEN LOWER BILAT EXTREMITY    (Results Pending)   DX-ANKLE 3+ VIEWS RIGHT    (Results Pending)       All current laboratory studies/radiology exams reviewed: Yes    Completed Consultations:  Dr. Pelaez and Dr. Back, orthopedic surgery  Dr. Lawrence, vascular surgery     Pending Consultations:  None    Newly Identified Diagnoses and Injuries:  Right ankle swelling, x-ray pending    RAP Score Total: 16    CAGE Results: not completed Blood Alcohol>0.08: no     Discussed patient condition with RN, Patient, and trauma surgery, Dr. Liao.
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
## 2022-08-18 NOTE — ASSESSMENT & PLAN NOTE
Comminuted displaced fractures of the distal left tibia and fibula with puncture wound to left shin.  CT ankle comminuted distal tibial metadiaphyseal fracture extending into the ankle mortise joint. Oblique comminuted distal fibular diaphyseal fracture.  8/17 Irrigation debridement open fracture and external fixation left leg..   8/19 Open treatment of left tibia shaft fracture with insertion of intramedullary implant.Open external fixation left distal tibia intra-articular fracture with fixation of fibula  Removal external fixator under anesthesia  Weight bearing status - Nonweightbearing LLE.   Darius Back MD. Orthopedic Surgeon. [facility].
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
## 2022-08-19 ENCOUNTER — ANESTHESIA EVENT (OUTPATIENT)
Dept: SURGERY | Facility: MEDICAL CENTER | Age: 67
DRG: 958 | End: 2022-08-19
Payer: MEDICARE

## 2022-08-19 ENCOUNTER — APPOINTMENT (OUTPATIENT)
Dept: RADIOLOGY | Facility: MEDICAL CENTER | Age: 67
DRG: 958 | End: 2022-08-19
Attending: ORTHOPAEDIC SURGERY
Payer: MEDICARE

## 2022-08-19 ENCOUNTER — APPOINTMENT (OUTPATIENT)
Dept: RADIOLOGY | Facility: MEDICAL CENTER | Age: 67
DRG: 958 | End: 2022-08-19
Attending: SURGERY
Payer: MEDICARE

## 2022-08-19 ENCOUNTER — ANESTHESIA (OUTPATIENT)
Dept: SURGERY | Facility: MEDICAL CENTER | Age: 67
DRG: 958 | End: 2022-08-19
Payer: MEDICARE

## 2022-08-19 LAB
ALBUMIN SERPL BCP-MCNC: 2.4 G/DL (ref 3.2–4.9)
ALBUMIN/GLOB SERPL: 1.3 G/DL
ALP SERPL-CCNC: 29 U/L (ref 30–99)
ALT SERPL-CCNC: 65 U/L (ref 2–50)
ANION GAP SERPL CALC-SCNC: 6 MMOL/L (ref 7–16)
AST SERPL-CCNC: 130 U/L (ref 12–45)
BARCODED ABORH UBTYP: 5100
BARCODED PRD CODE UBPRD: NORMAL
BARCODED UNIT NUM UBUNT: NORMAL
BASOPHILS # BLD AUTO: 0.2 % (ref 0–1.8)
BASOPHILS # BLD: 0.02 K/UL (ref 0–0.12)
BILIRUB SERPL-MCNC: 0.5 MG/DL (ref 0.1–1.5)
BUN SERPL-MCNC: 12 MG/DL (ref 8–22)
CALCIUM SERPL-MCNC: 7 MG/DL (ref 8.5–10.5)
CHLORIDE SERPL-SCNC: 105 MMOL/L (ref 96–112)
CO2 SERPL-SCNC: 25 MMOL/L (ref 20–33)
COMPONENT R 8504R: NORMAL
CREAT SERPL-MCNC: 0.71 MG/DL (ref 0.5–1.4)
EOSINOPHIL # BLD AUTO: 0.03 K/UL (ref 0–0.51)
EOSINOPHIL NFR BLD: 0.4 % (ref 0–6.9)
ERYTHROCYTE [DISTWIDTH] IN BLOOD BY AUTOMATED COUNT: 48.7 FL (ref 35.9–50)
ERYTHROCYTE [DISTWIDTH] IN BLOOD BY AUTOMATED COUNT: 49 FL (ref 35.9–50)
GFR SERPLBLD CREATININE-BSD FMLA CKD-EPI: 101 ML/MIN/1.73 M 2
GLOBULIN SER CALC-MCNC: 1.8 G/DL (ref 1.9–3.5)
GLUCOSE SERPL-MCNC: 123 MG/DL (ref 65–99)
HCT VFR BLD AUTO: 19.9 % (ref 42–52)
HCT VFR BLD AUTO: 21 % (ref 42–52)
HGB BLD-MCNC: 6.6 G/DL (ref 14–18)
HGB BLD-MCNC: 7.1 G/DL (ref 14–18)
HGB RETIC QN AUTO: 35 PG/CELL (ref 29–35)
IMM GRANULOCYTES # BLD AUTO: 0.03 K/UL (ref 0–0.11)
IMM GRANULOCYTES NFR BLD AUTO: 0.4 % (ref 0–0.9)
IMM RETICS NFR: 21.3 % (ref 9.3–17.4)
LYMPHOCYTES # BLD AUTO: 1.29 K/UL (ref 1–4.8)
LYMPHOCYTES NFR BLD: 15.8 % (ref 22–41)
MCH RBC QN AUTO: 33.2 PG (ref 27–33)
MCH RBC QN AUTO: 33.6 PG (ref 27–33)
MCHC RBC AUTO-ENTMCNC: 33.2 G/DL (ref 33.7–35.3)
MCHC RBC AUTO-ENTMCNC: 33.8 G/DL (ref 33.7–35.3)
MCV RBC AUTO: 100 FL (ref 81.4–97.8)
MCV RBC AUTO: 99.5 FL (ref 81.4–97.8)
MONOCYTES # BLD AUTO: 0.71 K/UL (ref 0–0.85)
MONOCYTES NFR BLD AUTO: 8.7 % (ref 0–13.4)
NEUTROPHILS # BLD AUTO: 6.07 K/UL (ref 1.82–7.42)
NEUTROPHILS NFR BLD: 74.5 % (ref 44–72)
NRBC # BLD AUTO: 0 K/UL
NRBC BLD-RTO: 0 /100 WBC
PLATELET # BLD AUTO: 64 K/UL (ref 164–446)
PLATELET # BLD AUTO: 66 K/UL (ref 164–446)
PMV BLD AUTO: 11 FL (ref 9–12.9)
PMV BLD AUTO: 11.2 FL (ref 9–12.9)
POTASSIUM SERPL-SCNC: 4.1 MMOL/L (ref 3.6–5.5)
PRODUCT TYPE UPROD: NORMAL
PROT SERPL-MCNC: 4.2 G/DL (ref 6–8.2)
RBC # BLD AUTO: 1.99 M/UL (ref 4.7–6.1)
RBC # BLD AUTO: 2.11 M/UL (ref 4.7–6.1)
RETICS # AUTO: 0.07 M/UL (ref 0.04–0.06)
RETICS/RBC NFR: 3.4 % (ref 0.8–2.1)
SODIUM SERPL-SCNC: 136 MMOL/L (ref 135–145)
UNIT STATUS USTAT: NORMAL
WBC # BLD AUTO: 8.2 K/UL (ref 4.8–10.8)
WBC # BLD AUTO: 9.1 K/UL (ref 4.8–10.8)

## 2022-08-19 PROCEDURE — 700102 HCHG RX REV CODE 250 W/ 637 OVERRIDE(OP): Performed by: SURGERY

## 2022-08-19 PROCEDURE — A9270 NON-COVERED ITEM OR SERVICE: HCPCS | Performed by: NURSE PRACTITIONER

## 2022-08-19 PROCEDURE — 700102 HCHG RX REV CODE 250 W/ 637 OVERRIDE(OP): Performed by: SPECIALIST

## 2022-08-19 PROCEDURE — 94669 MECHANICAL CHEST WALL OSCILL: CPT

## 2022-08-19 PROCEDURE — 0QSKXZZ REPOSITION LEFT FIBULA, EXTERNAL APPROACH: ICD-10-PCS | Performed by: ORTHOPAEDIC SURGERY

## 2022-08-19 PROCEDURE — 27828 TREAT LOWER LEG FRACTURE: CPT | Mod: 58,LT | Performed by: ORTHOPAEDIC SURGERY

## 2022-08-19 PROCEDURE — 700101 HCHG RX REV CODE 250: Performed by: SPECIALIST

## 2022-08-19 PROCEDURE — 770022 HCHG ROOM/CARE - ICU (200)

## 2022-08-19 PROCEDURE — A9270 NON-COVERED ITEM OR SERVICE: HCPCS | Performed by: SPECIALIST

## 2022-08-19 PROCEDURE — A9270 NON-COVERED ITEM OR SERVICE: HCPCS | Performed by: SURGERY

## 2022-08-19 PROCEDURE — 700111 HCHG RX REV CODE 636 W/ 250 OVERRIDE (IP): Performed by: ANESTHESIOLOGY

## 2022-08-19 PROCEDURE — 73590 X-RAY EXAM OF LOWER LEG: CPT | Mod: LT

## 2022-08-19 PROCEDURE — 30243N1 TRANSFUSION OF NONAUTOLOGOUS RED BLOOD CELLS INTO CENTRAL VEIN, PERCUTANEOUS APPROACH: ICD-10-PCS | Performed by: SURGERY

## 2022-08-19 PROCEDURE — 160002 HCHG RECOVERY MINUTES (STAT): Performed by: ORTHOPAEDIC SURGERY

## 2022-08-19 PROCEDURE — 700102 HCHG RX REV CODE 250 W/ 637 OVERRIDE(OP): Performed by: ANESTHESIOLOGY

## 2022-08-19 PROCEDURE — 27759 TREATMENT OF TIBIA FRACTURE: CPT | Mod: 80ROC,58,LT | Performed by: STUDENT IN AN ORGANIZED HEALTH CARE EDUCATION/TRAINING PROGRAM

## 2022-08-19 PROCEDURE — P9016 RBC LEUKOCYTES REDUCED: HCPCS

## 2022-08-19 PROCEDURE — 71045 X-RAY EXAM CHEST 1 VIEW: CPT

## 2022-08-19 PROCEDURE — 700111 HCHG RX REV CODE 636 W/ 250 OVERRIDE (IP): Performed by: ORTHOPAEDIC SURGERY

## 2022-08-19 PROCEDURE — 27828 TREAT LOWER LEG FRACTURE: CPT | Mod: 80ROC,58,LT | Performed by: STUDENT IN AN ORGANIZED HEALTH CARE EDUCATION/TRAINING PROGRAM

## 2022-08-19 PROCEDURE — 85027 COMPLETE CBC AUTOMATED: CPT

## 2022-08-19 PROCEDURE — 99233 SBSQ HOSP IP/OBS HIGH 50: CPT | Performed by: SURGERY

## 2022-08-19 PROCEDURE — 700111 HCHG RX REV CODE 636 W/ 250 OVERRIDE (IP): Performed by: SURGERY

## 2022-08-19 PROCEDURE — 20694 RMVL EXT FIXJ SYS UNDER ANES: CPT | Mod: 58 | Performed by: ORTHOPAEDIC SURGERY

## 2022-08-19 PROCEDURE — 73060 X-RAY EXAM OF HUMERUS: CPT | Mod: LT

## 2022-08-19 PROCEDURE — 86923 COMPATIBILITY TEST ELECTRIC: CPT

## 2022-08-19 PROCEDURE — 0QSH06Z REPOSITION LEFT TIBIA WITH INTRAMEDULLARY INTERNAL FIXATION DEVICE, OPEN APPROACH: ICD-10-PCS | Performed by: ORTHOPAEDIC SURGERY

## 2022-08-19 PROCEDURE — 160035 HCHG PACU - 1ST 60 MINS PHASE I: Performed by: ORTHOPAEDIC SURGERY

## 2022-08-19 PROCEDURE — A9270 NON-COVERED ITEM OR SERVICE: HCPCS | Performed by: ANESTHESIOLOGY

## 2022-08-19 PROCEDURE — 700102 HCHG RX REV CODE 250 W/ 637 OVERRIDE(OP): Performed by: NURSE PRACTITIONER

## 2022-08-19 PROCEDURE — 3E0T3BZ INTRODUCTION OF ANESTHETIC AGENT INTO PERIPHERAL NERVES AND PLEXI, PERCUTANEOUS APPROACH: ICD-10-PCS | Performed by: ANESTHESIOLOGY

## 2022-08-19 PROCEDURE — 99140 ANES COMP EMERGENCY COND: CPT | Performed by: ANESTHESIOLOGY

## 2022-08-19 PROCEDURE — 700101 HCHG RX REV CODE 250: Performed by: ANESTHESIOLOGY

## 2022-08-19 PROCEDURE — C1713 ANCHOR/SCREW BN/BN,TIS/BN: HCPCS | Performed by: ORTHOPAEDIC SURGERY

## 2022-08-19 PROCEDURE — 85046 RETICYTE/HGB CONCENTRATE: CPT

## 2022-08-19 PROCEDURE — 160029 HCHG SURGERY MINUTES - 1ST 30 MINS LEVEL 4: Performed by: ORTHOPAEDIC SURGERY

## 2022-08-19 PROCEDURE — 160048 HCHG OR STATISTICAL LEVEL 1-5: Performed by: ORTHOPAEDIC SURGERY

## 2022-08-19 PROCEDURE — 20694 RMVL EXT FIXJ SYS UNDER ANES: CPT | Mod: 80ROC,58 | Performed by: STUDENT IN AN ORGANIZED HEALTH CARE EDUCATION/TRAINING PROGRAM

## 2022-08-19 PROCEDURE — 700105 HCHG RX REV CODE 258: Performed by: SURGERY

## 2022-08-19 PROCEDURE — 80053 COMPREHEN METABOLIC PANEL: CPT

## 2022-08-19 PROCEDURE — 160041 HCHG SURGERY MINUTES - EA ADDL 1 MIN LEVEL 4: Performed by: ORTHOPAEDIC SURGERY

## 2022-08-19 PROCEDURE — 110371 HCHG SHELL REV 272: Performed by: ORTHOPAEDIC SURGERY

## 2022-08-19 PROCEDURE — 85025 COMPLETE CBC W/AUTO DIFF WBC: CPT

## 2022-08-19 PROCEDURE — 01392 ANES OPN PX UPR TIB FIB&/PAT: CPT | Performed by: ANESTHESIOLOGY

## 2022-08-19 PROCEDURE — 160036 HCHG PACU - EA ADDL 30 MINS PHASE I: Performed by: ORTHOPAEDIC SURGERY

## 2022-08-19 PROCEDURE — 36430 TRANSFUSION BLD/BLD COMPNT: CPT

## 2022-08-19 PROCEDURE — 27759 TREATMENT OF TIBIA FRACTURE: CPT | Mod: 58,LT | Performed by: ORTHOPAEDIC SURGERY

## 2022-08-19 PROCEDURE — 160009 HCHG ANES TIME/MIN: Performed by: ORTHOPAEDIC SURGERY

## 2022-08-19 DEVICE — SCREW CANN 4.0X38 SHORT OIC (3TX3+2TX2=13): Type: IMPLANTABLE DEVICE | Site: TIBIA | Status: FUNCTIONAL

## 2022-08-19 DEVICE — SCREW CROSS LOCK 5MM X 37.5MM (4TX5=20): Type: IMPLANTABLE DEVICE | Site: TIBIA | Status: FUNCTIONAL

## 2022-08-19 DEVICE — PLATE LOCKING 1/3 TUBULAR 6H (6TX2=12): Type: IMPLANTABLE DEVICE | Site: TIBIA | Status: FUNCTIONAL

## 2022-08-19 DEVICE — SCREW CROSS LOCK 5MM X 60MM (4TX5=20): Type: IMPLANTABLE DEVICE | Site: TIBIA | Status: FUNCTIONAL

## 2022-08-19 DEVICE — SCREW CANN 4.0X36 SHORT OIC (3TX3+2TX2=13): Type: IMPLANTABLE DEVICE | Site: TIBIA | Status: FUNCTIONAL

## 2022-08-19 DEVICE — SCREW 3.5 MM NON-LOCKING TI X 12MM LONG (6TX8+2TX5=58): Type: IMPLANTABLE DEVICE | Site: TIBIA | Status: FUNCTIONAL

## 2022-08-19 DEVICE — SCREW 3.5 MM NON-LOCKING TI X 14MM LONG (6TX8+2TX5=58): Type: IMPLANTABLE DEVICE | Site: TIBIA | Status: FUNCTIONAL

## 2022-08-19 DEVICE — NAIL TIBIAL 10MM X 360MM (2TX1=2): Type: IMPLANTABLE DEVICE | Site: TIBIA | Status: FUNCTIONAL

## 2022-08-19 RX ORDER — SODIUM CHLORIDE, SODIUM GLUCONATE, SODIUM ACETATE, POTASSIUM CHLORIDE AND MAGNESIUM CHLORIDE 526; 502; 368; 37; 30 MG/100ML; MG/100ML; MG/100ML; MG/100ML; MG/100ML
500 INJECTION, SOLUTION INTRAVENOUS CONTINUOUS
Status: DISCONTINUED | OUTPATIENT
Start: 2022-08-19 | End: 2022-08-19 | Stop reason: HOSPADM

## 2022-08-19 RX ORDER — MIDAZOLAM HYDROCHLORIDE 1 MG/ML
1 INJECTION INTRAMUSCULAR; INTRAVENOUS
Status: DISCONTINUED | OUTPATIENT
Start: 2022-08-19 | End: 2022-08-19 | Stop reason: HOSPADM

## 2022-08-19 RX ORDER — HYDROMORPHONE HYDROCHLORIDE 1 MG/ML
0.2 INJECTION, SOLUTION INTRAMUSCULAR; INTRAVENOUS; SUBCUTANEOUS
Status: DISCONTINUED | OUTPATIENT
Start: 2022-08-19 | End: 2022-08-19 | Stop reason: HOSPADM

## 2022-08-19 RX ORDER — DEXAMETHASONE SODIUM PHOSPHATE 4 MG/ML
INJECTION, SOLUTION INTRA-ARTICULAR; INTRALESIONAL; INTRAMUSCULAR; INTRAVENOUS; SOFT TISSUE PRN
Status: DISCONTINUED | OUTPATIENT
Start: 2022-08-19 | End: 2022-08-19 | Stop reason: SURG

## 2022-08-19 RX ORDER — MIDAZOLAM HYDROCHLORIDE 1 MG/ML
INJECTION INTRAMUSCULAR; INTRAVENOUS PRN
Status: DISCONTINUED | OUTPATIENT
Start: 2022-08-19 | End: 2022-08-19 | Stop reason: SURG

## 2022-08-19 RX ORDER — CEFAZOLIN SODIUM 2 G/100ML
2 INJECTION, SOLUTION INTRAVENOUS EVERY 8 HOURS
Status: COMPLETED | OUTPATIENT
Start: 2022-08-19 | End: 2022-08-22

## 2022-08-19 RX ORDER — ONDANSETRON 2 MG/ML
4 INJECTION INTRAMUSCULAR; INTRAVENOUS
Status: DISCONTINUED | OUTPATIENT
Start: 2022-08-19 | End: 2022-08-19 | Stop reason: HOSPADM

## 2022-08-19 RX ORDER — OXYCODONE HCL 5 MG/5 ML
10 SOLUTION, ORAL ORAL
Status: COMPLETED | OUTPATIENT
Start: 2022-08-19 | End: 2022-08-19

## 2022-08-19 RX ORDER — DIPHENHYDRAMINE HYDROCHLORIDE 50 MG/ML
12.5 INJECTION INTRAMUSCULAR; INTRAVENOUS
Status: DISCONTINUED | OUTPATIENT
Start: 2022-08-19 | End: 2022-08-19 | Stop reason: HOSPADM

## 2022-08-19 RX ORDER — LIDOCAINE HYDROCHLORIDE 20 MG/ML
INJECTION, SOLUTION EPIDURAL; INFILTRATION; INTRACAUDAL; PERINEURAL PRN
Status: DISCONTINUED | OUTPATIENT
Start: 2022-08-19 | End: 2022-08-19 | Stop reason: SURG

## 2022-08-19 RX ORDER — MEPERIDINE HYDROCHLORIDE 25 MG/ML
12.5 INJECTION INTRAMUSCULAR; INTRAVENOUS; SUBCUTANEOUS
Status: DISCONTINUED | OUTPATIENT
Start: 2022-08-19 | End: 2022-08-19 | Stop reason: HOSPADM

## 2022-08-19 RX ORDER — CEFAZOLIN SODIUM 1 G/3ML
INJECTION, POWDER, FOR SOLUTION INTRAMUSCULAR; INTRAVENOUS PRN
Status: DISCONTINUED | OUTPATIENT
Start: 2022-08-19 | End: 2022-08-19 | Stop reason: SURG

## 2022-08-19 RX ORDER — IPRATROPIUM BROMIDE AND ALBUTEROL SULFATE 2.5; .5 MG/3ML; MG/3ML
3 SOLUTION RESPIRATORY (INHALATION)
Status: DISCONTINUED | OUTPATIENT
Start: 2022-08-19 | End: 2022-08-19 | Stop reason: HOSPADM

## 2022-08-19 RX ORDER — HALOPERIDOL 5 MG/ML
1 INJECTION INTRAMUSCULAR
Status: DISCONTINUED | OUTPATIENT
Start: 2022-08-19 | End: 2022-08-19 | Stop reason: HOSPADM

## 2022-08-19 RX ORDER — HYDROMORPHONE HYDROCHLORIDE 1 MG/ML
0.4 INJECTION, SOLUTION INTRAMUSCULAR; INTRAVENOUS; SUBCUTANEOUS
Status: DISCONTINUED | OUTPATIENT
Start: 2022-08-19 | End: 2022-08-19 | Stop reason: HOSPADM

## 2022-08-19 RX ORDER — MEPERIDINE HYDROCHLORIDE 25 MG/ML
INJECTION INTRAMUSCULAR; INTRAVENOUS; SUBCUTANEOUS PRN
Status: DISCONTINUED | OUTPATIENT
Start: 2022-08-19 | End: 2022-08-19 | Stop reason: SURG

## 2022-08-19 RX ORDER — OXYCODONE HCL 5 MG/5 ML
5 SOLUTION, ORAL ORAL
Status: COMPLETED | OUTPATIENT
Start: 2022-08-19 | End: 2022-08-19

## 2022-08-19 RX ORDER — KETOROLAC TROMETHAMINE 30 MG/ML
INJECTION, SOLUTION INTRAMUSCULAR; INTRAVENOUS PRN
Status: DISCONTINUED | OUTPATIENT
Start: 2022-08-19 | End: 2022-08-19 | Stop reason: SURG

## 2022-08-19 RX ORDER — ONDANSETRON 2 MG/ML
INJECTION INTRAMUSCULAR; INTRAVENOUS PRN
Status: DISCONTINUED | OUTPATIENT
Start: 2022-08-19 | End: 2022-08-19 | Stop reason: SURG

## 2022-08-19 RX ORDER — HYDROMORPHONE HYDROCHLORIDE 1 MG/ML
1 INJECTION, SOLUTION INTRAMUSCULAR; INTRAVENOUS; SUBCUTANEOUS
Status: DISCONTINUED | OUTPATIENT
Start: 2022-08-19 | End: 2022-08-19 | Stop reason: HOSPADM

## 2022-08-19 RX ADMIN — CEFAZOLIN SODIUM 2 G: 2 INJECTION, SOLUTION INTRAVENOUS at 00:18

## 2022-08-19 RX ADMIN — MORPHINE SULFATE 2 MG: 4 INJECTION INTRAVENOUS at 21:03

## 2022-08-19 RX ADMIN — CEFAZOLIN 2 G: 330 INJECTION, POWDER, FOR SOLUTION INTRAMUSCULAR; INTRAVENOUS at 08:05

## 2022-08-19 RX ADMIN — DOCUSATE SODIUM 100 MG: 100 CAPSULE, LIQUID FILLED ORAL at 05:55

## 2022-08-19 RX ADMIN — FAMOTIDINE 20 MG: 20 TABLET, FILM COATED ORAL at 17:54

## 2022-08-19 RX ADMIN — FENTANYL CITRATE 100 MCG: 50 INJECTION, SOLUTION INTRAMUSCULAR; INTRAVENOUS at 08:01

## 2022-08-19 RX ADMIN — ONDANSETRON 4 MG: 2 INJECTION INTRAMUSCULAR; INTRAVENOUS at 09:07

## 2022-08-19 RX ADMIN — OXYCODONE HYDROCHLORIDE 10 MG: 10 TABLET ORAL at 02:55

## 2022-08-19 RX ADMIN — GABAPENTIN 100 MG: 100 CAPSULE ORAL at 14:07

## 2022-08-19 RX ADMIN — DEXAMETHASONE SODIUM PHOSPHATE 8 MG: 4 INJECTION, SOLUTION INTRA-ARTICULAR; INTRALESIONAL; INTRAMUSCULAR; INTRAVENOUS; SOFT TISSUE at 08:07

## 2022-08-19 RX ADMIN — ACETAMINOPHEN 650 MG: 325 TABLET, FILM COATED ORAL at 17:54

## 2022-08-19 RX ADMIN — ENOXAPARIN SODIUM 30 MG: 30 INJECTION SUBCUTANEOUS at 17:53

## 2022-08-19 RX ADMIN — MORPHINE SULFATE 4 MG: 4 INJECTION INTRAVENOUS at 05:56

## 2022-08-19 RX ADMIN — LIDOCAINE 1 PATCH: 50 PATCH TOPICAL at 23:23

## 2022-08-19 RX ADMIN — METAXALONE 400 MG: 800 TABLET ORAL at 17:52

## 2022-08-19 RX ADMIN — SUGAMMADEX 200 MG: 100 INJECTION, SOLUTION INTRAVENOUS at 09:07

## 2022-08-19 RX ADMIN — ACETAMINOPHEN 650 MG: 325 TABLET, FILM COATED ORAL at 14:07

## 2022-08-19 RX ADMIN — POLYETHYLENE GLYCOL 3350 1 PACKET: 17 POWDER, FOR SOLUTION ORAL at 17:52

## 2022-08-19 RX ADMIN — GABAPENTIN 100 MG: 100 CAPSULE ORAL at 17:54

## 2022-08-19 RX ADMIN — MORPHINE SULFATE 4 MG: 4 INJECTION INTRAVENOUS at 00:16

## 2022-08-19 RX ADMIN — LIDOCAINE 1 PATCH: 50 PATCH TOPICAL at 00:17

## 2022-08-19 RX ADMIN — METAXALONE 400 MG: 800 TABLET ORAL at 05:55

## 2022-08-19 RX ADMIN — MIDAZOLAM HYDROCHLORIDE 2 MG: 1 INJECTION, SOLUTION INTRAMUSCULAR; INTRAVENOUS at 08:01

## 2022-08-19 RX ADMIN — OXYCODONE 5 MG: 5 TABLET ORAL at 19:26

## 2022-08-19 RX ADMIN — MEPERIDINE HYDROCHLORIDE 25 MG: 25 INJECTION INTRAMUSCULAR; INTRAVENOUS; SUBCUTANEOUS at 09:08

## 2022-08-19 RX ADMIN — MAGNESIUM HYDROXIDE 30 ML: 400 SUSPENSION ORAL at 05:55

## 2022-08-19 RX ADMIN — METAXALONE 400 MG: 800 TABLET ORAL at 14:07

## 2022-08-19 RX ADMIN — MORPHINE SULFATE 4 MG: 4 INJECTION INTRAVENOUS at 02:55

## 2022-08-19 RX ADMIN — FAMOTIDINE 20 MG: 10 INJECTION, SOLUTION INTRAVENOUS at 05:56

## 2022-08-19 RX ADMIN — ACETAMINOPHEN 650 MG: 325 TABLET, FILM COATED ORAL at 20:48

## 2022-08-19 RX ADMIN — PROPOFOL 100 MG: 10 INJECTION, EMULSION INTRAVENOUS at 08:04

## 2022-08-19 RX ADMIN — DOCUSATE SODIUM 50 MG AND SENNOSIDES 8.6 MG 1 TABLET: 8.6; 5 TABLET, FILM COATED ORAL at 21:05

## 2022-08-19 RX ADMIN — CEFAZOLIN SODIUM 2 G: 2 INJECTION, SOLUTION INTRAVENOUS at 15:39

## 2022-08-19 RX ADMIN — DOCUSATE SODIUM 100 MG: 100 CAPSULE, LIQUID FILLED ORAL at 17:54

## 2022-08-19 RX ADMIN — SODIUM CHLORIDE, POTASSIUM CHLORIDE, SODIUM LACTATE AND CALCIUM CHLORIDE: 600; 310; 30; 20 INJECTION, SOLUTION INTRAVENOUS at 06:04

## 2022-08-19 RX ADMIN — GABAPENTIN 100 MG: 100 CAPSULE ORAL at 05:55

## 2022-08-19 RX ADMIN — OXYCODONE 5 MG: 5 TABLET ORAL at 19:03

## 2022-08-19 RX ADMIN — LIDOCAINE HYDROCHLORIDE 50 MG: 20 INJECTION, SOLUTION EPIDURAL; INFILTRATION; INTRACAUDAL at 08:04

## 2022-08-19 RX ADMIN — OXYCODONE HYDROCHLORIDE 10 MG: 10 TABLET ORAL at 23:25

## 2022-08-19 RX ADMIN — OXYCODONE 5 MG: 5 TABLET ORAL at 15:38

## 2022-08-19 RX ADMIN — CEFAZOLIN SODIUM 2 G: 2 INJECTION, SOLUTION INTRAVENOUS at 23:23

## 2022-08-19 RX ADMIN — ROCURONIUM BROMIDE 50 MG: 10 INJECTION, SOLUTION INTRAVENOUS at 08:04

## 2022-08-19 RX ADMIN — KETOROLAC TROMETHAMINE 30 MG: 30 INJECTION, SOLUTION INTRAMUSCULAR at 09:07

## 2022-08-19 RX ADMIN — OXYCODONE HYDROCHLORIDE 10 MG: 5 SOLUTION ORAL at 10:28

## 2022-08-19 ASSESSMENT — FIBROSIS 4 INDEX: FIB4 SCORE: 16.38

## 2022-08-19 ASSESSMENT — PAIN DESCRIPTION - PAIN TYPE
TYPE: ACUTE PAIN
TYPE: ACUTE PAIN;SURGICAL PAIN
TYPE: ACUTE PAIN
TYPE: ACUTE PAIN;SURGICAL PAIN
TYPE: ACUTE PAIN

## 2022-08-19 ASSESSMENT — PATIENT HEALTH QUESTIONNAIRE - PHQ9
2. FEELING DOWN, DEPRESSED, IRRITABLE, OR HOPELESS: NOT AT ALL
SUM OF ALL RESPONSES TO PHQ9 QUESTIONS 1 AND 2: 0
1. LITTLE INTEREST OR PLEASURE IN DOING THINGS: NOT AT ALL

## 2022-08-19 ASSESSMENT — PAIN SCALES - GENERAL: PAIN_LEVEL: 8
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
## 2022-08-19 NOTE — PROGRESS NOTES
Pt presents to OR 19. Pt was consented by MD at bedside, confirmed by this RN and consent at bedside. Pt transferred to OR table in supine position. Patient underwent a left subclavian stent placement by Dr. Lawrence. Procedure site was marked by MD and verified using imaging guidance. Pt placed on monitor, prepped and draped in a sterile fashion. Vitals were taken every 5 minutes and remained stable during procedure (see doc flow sheet for results). CO2 waveform capnography was monitored and remained WNL throughout procedure. Report called to Mack PARADA. Pt transported by yumiko with RN to SICU bed 126.     OMNILINK ELITE  Vascular balloon expandable stent stystem  8.0 mm x 39 mm x 80 mm   REF: [id number]  LOT: [id number]  Exp: 01.31.2025        AngioSeal   Vascular closure device   6 fr  REF: [id number]  LOT: [id number]  Exp: 05.31.2025

## 2022-08-19 NOTE — CARE PLAN
Pt is A/O x 4. Pt has reported 10/10 pain, and has received PRN morphine and oxycodone with moderate relief. Pain has made it difficult for pt to tolerate repositioning and movement. Q2H turns performed as pt tolerated and allowed. Q1H L radial pulse checks completed and pulse remains palpable and equal to the R radial pulse. Safety precautions in place. Call light within reach. Bed alarm on.       At 05:30, RN entered pt's room because A-line was not reading. A-line was out of site. RN quickly put pressure on site. Minimal blood loss. Loss of art-line communicated to pre op RN when he called. Vital signs are stable, and pt is not on any pressors or sedation.        The patient is Stable - Low risk of patient condition declining or worsening    Shift Goals  Clinical Goals: Pain control, movement as tolerated, SBD <160, Rest  Patient Goals: Pain control, sleep  Family Goals: Pain control, sleep    Progress made toward(s) clinical / shift goals:       Problem: Knowledge Deficit - Standard  Goal: Patient and family/care givers will demonstrate understanding of plan of care, disease process/condition, diagnostic tests and medications  Outcome: Progressing     Problem: Skin Integrity  Goal: Skin integrity is maintained or improved  Outcome: Progressing     Problem: Fall Risk  Goal: Patient will remain free from falls  Outcome: Progressing     Patient is not progressing towards the following goals:

## 2022-08-19 NOTE — PROGRESS NOTES
Dr Lawrence at bedside. Patient in OR. MD updated on patient presentation prior to OR. Per MD ok for q4 vascular checks and ok to transfer pending UE exam remains consistent after OR.

## 2022-08-19 NOTE — OR NURSING
Pt arouses to voice and is able to follow commands (squeeze hands, wiggle toes). Pt reports LLE pain 8/10. Leg elevated on pillows and Ice packs in place. Pain medication administered per orders.   VSS on 4 L O2 via NC. CMS intact x RUE, LUE, LLE, RLE.   Surgical drsg CDI.   Update called to pt's wife's nurse.     Report called to KARMA Giron. Pt transported back to SICU on monitor, accompanied by ACLS RN. Portable O2 tank full for transport.
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
## 2022-08-19 NOTE — ANESTHESIA PROCEDURE NOTES
Airway    Date/Time: 8/19/2022 8:04 AM  Performed by: Leonardo Chambers III, M.D.  Authorized by: Leonardo Chambers III, M.D.     Location:  OR  Urgency:  Elective  Difficult Airway: No    Indications for Airway Management:  Anesthesia      Spontaneous Ventilation: absent    Sedation Level:  Deep  Preoxygenated: Yes    Final Airway Type:  Supraglottic airway  Final Supraglottic Airway:  Standard LMA    SGA Size:  4  Number of Attempts at Approach:  1
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
## 2022-08-19 NOTE — PROGRESS NOTES
Pt returned to SICU from PACU, placed on monitoring systems, 4 eyes completed, Daughter updated on pt status. Pt resting comfortably in bed.    4 Eyes Skin Assessment Completed by Mack RN and KARMA Monet.    Head WDL  Ears WDL  Nose Scab  Mouth WDL  Neck Bruising  Breast/Chest Bruising  Shoulder Blades WDL  Spine WDL  (R) Arm/Elbow/Hand Bruising, Scab, and Swelling  (L) Arm/Elbow/Hand Bruising, Scab, and Swelling  Abdomen WDL  Groin Incision  Scrotum/Coccyx/Buttocks WDL  (R) Leg Swelling and Abrasion  (L) Leg Rash, Scab, Bruising, Swelling, Abrasion, and Incision    (R) Heel/Foot/Toe Bruising and Swelling  (L) Heel/Foot/Toe Bruising and Swelling          Devices In Places ECG, Pulse Ox, SCD's, Condom Cath, Nasal Cannula, and Compression Dressing      Interventions In Place NC W/Ear Foams, Sacral Mepilex, Pillows, Low Air Loss Mattress, and Heels Loaded W/Pillows    Possible Skin Injury No    Pictures Uploaded Into Epic Yes  Wound Consult Placed N/A  RN Wound Prevention Protocol Ordered Yes
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
## 2022-08-19 NOTE — ANESTHESIA TIME REPORT
Anesthesia Start and Stop Event Times     Date Time Event    8/19/2022 0744 Ready for Procedure     0759 Anesthesia Start     0936 Anesthesia Stop        Responsible Staff  08/19/22    Name Role Begin End    Leonardo Chambers III, M.D. Anesth 0759 0932        Overtime Reason:  no overtime (within assigned shift)    Comments:

## 2022-08-19 NOTE — ANESTHESIA POSTPROCEDURE EVALUATION
Patient: Ezekiel Sanz    Procedure Summary     Date: 08/19/22 Room / Location: [place] [address] / SURGERY [facility]    Anesthesia Start: 0759 Anesthesia Stop: 0936    Procedure: LEFT INSERTION, INTRAMEDULLARY JACK, TIBIA (Left: Leg Lower) Diagnosis: (left tibial fracture)    Surgeons: Darius Back M.D. Responsible Provider: Leonardo Chambers III, M.D.    Anesthesia Type: general, peripheral nerve block ASA Status: 3 - Emergent          Final Anesthesia Type: general, peripheral nerve block  Last vitals  BP   Blood Pressure : 118/57, Arterial BP: 123/44    Temp   37.1 °C (98.7 °F)    Pulse   79   Resp   15    SpO2   97 %      Anesthesia Post Evaluation    Patient location during evaluation: PACU  Patient participation: complete - patient participated  Level of consciousness: awake and alert  Pain score: 8  Acute post op pain being well managed by recovery room staff  Airway patency: patent  Anesthetic complications: no  Cardiovascular status: hemodynamically stable  Respiratory status: acceptable  Hydration status: euvolemic    PONV: none          No notable events documented.     Nurse Pain Score: 8 (NPRS)
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
## 2022-08-19 NOTE — PROGRESS NOTES
Pt to SICU 126 from OR, report previously called by Carie PARADA. Pt arrived at 1620, connected to SICU monitoring. MG vascular checks done, radial pulse 2+ palpable, brachial pulse 1+ palpable.

## 2022-08-20 ENCOUNTER — APPOINTMENT (OUTPATIENT)
Dept: RADIOLOGY | Facility: MEDICAL CENTER | Age: 67
DRG: 958 | End: 2022-08-20
Attending: SURGERY
Payer: MEDICARE

## 2022-08-20 PROBLEM — D50.0 BLOOD LOSS ANEMIA: Status: ACTIVE | Noted: 2022-08-20

## 2022-08-20 LAB
ALBUMIN SERPL BCP-MCNC: 2.7 G/DL (ref 3.2–4.9)
ALBUMIN/GLOB SERPL: 1.2 G/DL
ALP SERPL-CCNC: 38 U/L (ref 30–99)
ALT SERPL-CCNC: 54 U/L (ref 2–50)
ANION GAP SERPL CALC-SCNC: 6 MMOL/L (ref 7–16)
AST SERPL-CCNC: 114 U/L (ref 12–45)
BASOPHILS # BLD AUTO: 0.2 % (ref 0–1.8)
BASOPHILS # BLD: 0.02 K/UL (ref 0–0.12)
BILIRUB SERPL-MCNC: 0.9 MG/DL (ref 0.1–1.5)
BUN SERPL-MCNC: 11 MG/DL (ref 8–22)
CALCIUM SERPL-MCNC: 7.3 MG/DL (ref 8.5–10.5)
CHLORIDE SERPL-SCNC: 103 MMOL/L (ref 96–112)
CO2 SERPL-SCNC: 26 MMOL/L (ref 20–33)
CREAT SERPL-MCNC: 0.74 MG/DL (ref 0.5–1.4)
EOSINOPHIL # BLD AUTO: 0 K/UL (ref 0–0.51)
EOSINOPHIL NFR BLD: 0 % (ref 0–6.9)
ERYTHROCYTE [DISTWIDTH] IN BLOOD BY AUTOMATED COUNT: 60.3 FL (ref 35.9–50)
GFR SERPLBLD CREATININE-BSD FMLA CKD-EPI: 100 ML/MIN/1.73 M 2
GLOBULIN SER CALC-MCNC: 2.2 G/DL (ref 1.9–3.5)
GLUCOSE SERPL-MCNC: 126 MG/DL (ref 65–99)
HCT VFR BLD AUTO: 21.3 % (ref 42–52)
HGB BLD-MCNC: 7.2 G/DL (ref 14–18)
IMM GRANULOCYTES # BLD AUTO: 0.09 K/UL (ref 0–0.11)
IMM GRANULOCYTES NFR BLD AUTO: 0.8 % (ref 0–0.9)
LYMPHOCYTES # BLD AUTO: 1.2 K/UL (ref 1–4.8)
LYMPHOCYTES NFR BLD: 10.5 % (ref 22–41)
MCH RBC QN AUTO: 31 PG (ref 27–33)
MCHC RBC AUTO-ENTMCNC: 33.8 G/DL (ref 33.7–35.3)
MCV RBC AUTO: 91.8 FL (ref 81.4–97.8)
MONOCYTES # BLD AUTO: 0.69 K/UL (ref 0–0.85)
MONOCYTES NFR BLD AUTO: 6 % (ref 0–13.4)
NEUTROPHILS # BLD AUTO: 9.42 K/UL (ref 1.82–7.42)
NEUTROPHILS NFR BLD: 82.5 % (ref 44–72)
NRBC # BLD AUTO: 0 K/UL
NRBC BLD-RTO: 0 /100 WBC
PLATELET # BLD AUTO: 74 K/UL (ref 164–446)
PMV BLD AUTO: 10.6 FL (ref 9–12.9)
POTASSIUM SERPL-SCNC: 4.2 MMOL/L (ref 3.6–5.5)
PROT SERPL-MCNC: 4.9 G/DL (ref 6–8.2)
RBC # BLD AUTO: 2.32 M/UL (ref 4.7–6.1)
SODIUM SERPL-SCNC: 135 MMOL/L (ref 135–145)
WBC # BLD AUTO: 11.4 K/UL (ref 4.8–10.8)

## 2022-08-20 PROCEDURE — 99233 SBSQ HOSP IP/OBS HIGH 50: CPT | Performed by: SURGERY

## 2022-08-20 PROCEDURE — 700102 HCHG RX REV CODE 250 W/ 637 OVERRIDE(OP): Performed by: SURGERY

## 2022-08-20 PROCEDURE — 700111 HCHG RX REV CODE 636 W/ 250 OVERRIDE (IP): Performed by: ORTHOPAEDIC SURGERY

## 2022-08-20 PROCEDURE — 700102 HCHG RX REV CODE 250 W/ 637 OVERRIDE(OP): Performed by: SPECIALIST

## 2022-08-20 PROCEDURE — A9270 NON-COVERED ITEM OR SERVICE: HCPCS | Performed by: SPECIALIST

## 2022-08-20 PROCEDURE — 94669 MECHANICAL CHEST WALL OSCILL: CPT

## 2022-08-20 PROCEDURE — A9270 NON-COVERED ITEM OR SERVICE: HCPCS | Performed by: SURGERY

## 2022-08-20 PROCEDURE — 71045 X-RAY EXAM CHEST 1 VIEW: CPT

## 2022-08-20 PROCEDURE — 85025 COMPLETE CBC W/AUTO DIFF WBC: CPT

## 2022-08-20 PROCEDURE — 770022 HCHG ROOM/CARE - ICU (200)

## 2022-08-20 PROCEDURE — 700102 HCHG RX REV CODE 250 W/ 637 OVERRIDE(OP): Performed by: NURSE PRACTITIONER

## 2022-08-20 PROCEDURE — 700111 HCHG RX REV CODE 636 W/ 250 OVERRIDE (IP): Performed by: SURGERY

## 2022-08-20 PROCEDURE — A9270 NON-COVERED ITEM OR SERVICE: HCPCS | Performed by: NURSE PRACTITIONER

## 2022-08-20 PROCEDURE — 80053 COMPREHEN METABOLIC PANEL: CPT

## 2022-08-20 PROCEDURE — 700101 HCHG RX REV CODE 250: Performed by: SPECIALIST

## 2022-08-20 RX ORDER — ACETAMINOPHEN 325 MG/1
650 TABLET ORAL EVERY 6 HOURS
Status: DISCONTINUED | OUTPATIENT
Start: 2022-08-21 | End: 2022-08-30 | Stop reason: HOSPADM

## 2022-08-20 RX ADMIN — DOCUSATE SODIUM 50 MG AND SENNOSIDES 8.6 MG 1 TABLET: 8.6; 5 TABLET, FILM COATED ORAL at 23:13

## 2022-08-20 RX ADMIN — TAMSULOSIN HYDROCHLORIDE 0.4 MG: 0.4 CAPSULE ORAL at 08:58

## 2022-08-20 RX ADMIN — LIDOCAINE 1 PATCH: 50 PATCH TOPICAL at 23:14

## 2022-08-20 RX ADMIN — ACETAMINOPHEN 650 MG: 325 TABLET, FILM COATED ORAL at 12:08

## 2022-08-20 RX ADMIN — MAGNESIUM HYDROXIDE 30 ML: 400 SUSPENSION ORAL at 05:28

## 2022-08-20 RX ADMIN — POLYETHYLENE GLYCOL 3350 1 PACKET: 17 POWDER, FOR SOLUTION ORAL at 18:03

## 2022-08-20 RX ADMIN — ACETAMINOPHEN 650 MG: 325 TABLET ORAL at 23:13

## 2022-08-20 RX ADMIN — METAXALONE 400 MG: 800 TABLET ORAL at 05:26

## 2022-08-20 RX ADMIN — ACETAMINOPHEN 650 MG: 325 TABLET, FILM COATED ORAL at 08:58

## 2022-08-20 RX ADMIN — ENOXAPARIN SODIUM 30 MG: 30 INJECTION SUBCUTANEOUS at 05:27

## 2022-08-20 RX ADMIN — OXYCODONE HYDROCHLORIDE 10 MG: 10 TABLET ORAL at 16:03

## 2022-08-20 RX ADMIN — ACETAMINOPHEN 650 MG: 325 TABLET, FILM COATED ORAL at 16:04

## 2022-08-20 RX ADMIN — OXYCODONE 5 MG: 5 TABLET ORAL at 12:08

## 2022-08-20 RX ADMIN — MORPHINE SULFATE 4 MG: 4 INJECTION INTRAVENOUS at 04:48

## 2022-08-20 RX ADMIN — METAXALONE 400 MG: 800 TABLET ORAL at 18:04

## 2022-08-20 RX ADMIN — METAXALONE 400 MG: 800 TABLET ORAL at 12:07

## 2022-08-20 RX ADMIN — CEFAZOLIN SODIUM 2 G: 2 INJECTION, SOLUTION INTRAVENOUS at 08:58

## 2022-08-20 RX ADMIN — GABAPENTIN 100 MG: 100 CAPSULE ORAL at 05:26

## 2022-08-20 RX ADMIN — GABAPENTIN 100 MG: 100 CAPSULE ORAL at 12:07

## 2022-08-20 RX ADMIN — OXYCODONE HYDROCHLORIDE 10 MG: 10 TABLET ORAL at 23:13

## 2022-08-20 RX ADMIN — ENOXAPARIN SODIUM 30 MG: 30 INJECTION SUBCUTANEOUS at 18:04

## 2022-08-20 RX ADMIN — POLYETHYLENE GLYCOL 3350 1 PACKET: 17 POWDER, FOR SOLUTION ORAL at 05:27

## 2022-08-20 RX ADMIN — MORPHINE SULFATE 4 MG: 4 INJECTION INTRAVENOUS at 01:23

## 2022-08-20 RX ADMIN — FAMOTIDINE 20 MG: 20 TABLET, FILM COATED ORAL at 05:26

## 2022-08-20 RX ADMIN — OXYCODONE 5 MG: 5 TABLET ORAL at 20:17

## 2022-08-20 RX ADMIN — CEFAZOLIN SODIUM 2 G: 2 INJECTION, SOLUTION INTRAVENOUS at 23:15

## 2022-08-20 RX ADMIN — DOCUSATE SODIUM 100 MG: 100 CAPSULE, LIQUID FILLED ORAL at 18:04

## 2022-08-20 RX ADMIN — GABAPENTIN 100 MG: 100 CAPSULE ORAL at 18:03

## 2022-08-20 RX ADMIN — DOCUSATE SODIUM 100 MG: 100 CAPSULE, LIQUID FILLED ORAL at 05:26

## 2022-08-20 RX ADMIN — OXYCODONE HYDROCHLORIDE 10 MG: 10 TABLET ORAL at 07:25

## 2022-08-20 RX ADMIN — CEFAZOLIN SODIUM 2 G: 2 INJECTION, SOLUTION INTRAVENOUS at 16:04

## 2022-08-20 ASSESSMENT — ENCOUNTER SYMPTOMS
ARTHRALGIAS: 1
MYALGIAS: 1
NUMBNESS: 1
JOINT SWELLING: 1

## 2022-08-20 ASSESSMENT — PAIN DESCRIPTION - PAIN TYPE
TYPE: ACUTE PAIN

## 2022-08-20 ASSESSMENT — COGNITIVE AND FUNCTIONAL STATUS - GENERAL
DAILY ACTIVITIY SCORE: 12
DRESSING REGULAR UPPER BODY CLOTHING: A LOT
HELP NEEDED FOR BATHING: A LOT
EATING MEALS: A LOT
DRESSING REGULAR LOWER BODY CLOTHING: A LOT
CLIMB 3 TO 5 STEPS WITH RAILING: A LOT
PERSONAL GROOMING: A LOT
TOILETING: A LOT
TURNING FROM BACK TO SIDE WHILE IN FLAT BAD: A LITTLE
MOVING FROM LYING ON BACK TO SITTING ON SIDE OF FLAT BED: A LOT
WALKING IN HOSPITAL ROOM: A LOT
SUGGESTED CMS G CODE MODIFIER MOBILITY: CL
STANDING UP FROM CHAIR USING ARMS: A LOT
MOVING TO AND FROM BED TO CHAIR: A LITTLE
MOBILITY SCORE: 14
SUGGESTED CMS G CODE MODIFIER DAILY ACTIVITY: CL

## 2022-08-20 ASSESSMENT — LIFESTYLE VARIABLES
HAVE YOU EVER FELT YOU SHOULD CUT DOWN ON YOUR DRINKING: NO
TOTAL SCORE: 0
ALCOHOL_USE: YES
CONSUMPTION TOTAL: NEGATIVE
EVER FELT BAD OR GUILTY ABOUT YOUR DRINKING: NO
HOW MANY TIMES IN THE PAST YEAR HAVE YOU HAD 5 OR MORE DRINKS IN A DAY: 0
DOES PATIENT WANT TO STOP DRINKING: NO
EVER HAD A DRINK FIRST THING IN THE MORNING TO STEADY YOUR NERVES TO GET RID OF A HANGOVER: NO
TOTAL SCORE: 0
ON A TYPICAL DAY WHEN YOU DRINK ALCOHOL HOW MANY DRINKS DO YOU HAVE: 1
TOTAL SCORE: 0
HAVE PEOPLE ANNOYED YOU BY CRITICIZING YOUR DRINKING: NO
AVERAGE NUMBER OF DAYS PER WEEK YOU HAVE A DRINK CONTAINING ALCOHOL: 1

## 2022-08-20 NOTE — PROGRESS NOTES
S:  s/p ORIF left tibia, removal of ex-fix  Pain controlled  No N/V  No Chest Pain/SOB  Afebrile  Exam:    NAD A& O x3    LLE:  Splint intact, able to wiggle toes. Good cap refill    Plan:   The patient should be toe touch weightbearing on their operative extremity.  Gait aids (crutch or crutches, cane, walker) may be used as needed, and may be discontinued when no longer required.  2.  IV antibiotics - may be continued for 24 hours, but are not required.  3.  DVT prophylaxis - SCD's and Lovenox 40 mg SQ daily while inpatient.  The patient may transition to Aspirin 325 mg PO BID as an outpatient  4.  Discharge planning

## 2022-08-20 NOTE — CARE PLAN
2The patient is Stable - Low risk of patient condition declining or worsening    Shift Goals  Clinical Goals: pain control, mobility  Patient Goals: pain control, go home  Family Goals: no family present    Progress made toward(s) clinical / shift goals:        Problem: Knowledge Deficit - Standard  Goal: Patient and family/care givers will demonstrate understanding of plan of care, disease process/condition, diagnostic tests and medications  Outcome: Progressing     Problem: Skin Integrity  Goal: Skin integrity is maintained or improved  8/20/2022 0316 by Hazel Nayak, R.N.  Outcome: Progressing  8/20/2022 0315 by Hazel Nayak, R.N.  Outcome: Progressing     Problem: Fall Risk  Goal: Patient will remain free from falls  8/20/2022 0316 by Hazel Nayak, R.N.  Outcome: Progressing  8/20/2022 0315 by Hazel Nayak, R.N.  Outcome: Progressing     Problem: Neurovascular Monitoring  Goal: Patient's neurovascular status will be maintained or improve  8/20/2022 0316 by Hazel Nayak, R.N.  Outcome: Progressing  8/20/2022 0315 by Hazel Nayak, R.N.  Outcome: Progressing       Patient is not progressing towards the following goals:      Problem: Pain - Standard  Goal: Alleviation of pain or a reduction in pain to the patient’s comfort goal  Outcome: Not Progressing     Problem: Early Mobilization - Post Surgery  Goal: Early mobilization post surgery  8/20/2022 0316 by Hazel Nayak, R.N.  Outcome: Not Progressing  8/20/2022 0315 by Hazel Nayak, R.N.  Outcome: Not Progressing     Problem: Bowel Elimination - Post Surgical  Goal: Patient will resume regular bowel sounds and function with no discomfort or distention  8/20/2022 0316 by Hazel Nayak, R.N.  Outcome: Not Progressing  8/20/2022 0315 by Hazel Nayak, R.N.  Outcome: Not Progressing

## 2022-08-20 NOTE — PROGRESS NOTES
RN in room for medication administration. Pt alerted this RN of increasing numbness and tingling in the left upper hand. Increased bruising and swelling noted to this arm, L radial pulse 2+, brachial pulse 1+, Dr. Issa and Dr. Boyce aware.

## 2022-08-20 NOTE — PROGRESS NOTES
VASCULAR SURGERY PROGRESS NOTE     Notified by RN that patient complained of numbness in his left hand.    Awake, no complaints.  Patient is now states that the numbness in his left hand have resolved.    Physical exam:  General: Well-developed well-nourished elderly male in none apparent distress.  Left upper extremity: Old ecchymosis in the arm.  Compartments are soft.  There is very mild swelling at the brachial access site.  Palpable distal radial pulses with multiphasic flow.  1-2+ edema.  Sensory and motor function are intact.    Assessment:  1) Status post stent graft repair of traumatic aortic transection.  2) Status post left subclavian arterial stenting.    Plan:  Patient's left upper extremity arterial circulation is intact.  There is no evidence of median nerve palsy.  Keep arm straight and elevated on 2 pillows.  Notify vascular service if further problems.    Discussed with patient.  All questions were answered.    Discussed with RN.

## 2022-08-20 NOTE — CARE PLAN
The patient is Stable - Low risk of patient condition declining or worsening    Shift Goals  Clinical Goals: pain control, mobilize  Patient Goals: pain control, eat  Family Goals: not present    Progress made toward(s) clinical / shift goals:    Problem: Knowledge Deficit - Standard  Goal: Patient and family/care givers will demonstrate understanding of plan of care, disease process/condition, diagnostic tests and medications  Outcome: Progressing     Problem: Skin Integrity  Goal: Skin integrity is maintained or improved  Outcome: Progressing     Problem: Fall Risk  Goal: Patient will remain free from falls  Outcome: Progressing     Problem: Pain - Standard  Goal: Alleviation of pain or a reduction in pain to the patient’s comfort goal  Outcome: Progressing     Problem: Neurovascular Monitoring  Goal: Patient's neurovascular status will be maintained or improve  Outcome: Progressing     Problem: Early Mobilization - Post Surgery  Goal: Early mobilization post surgery  Outcome: Progressing     Problem: Bowel Elimination - Post Surgical  Goal: Patient will resume regular bowel sounds and function with no discomfort or distention  Outcome: Progressing

## 2022-08-20 NOTE — CARE PLAN
Problem: Knowledge Deficit - Standard  Goal: Patient and family/care givers will demonstrate understanding of plan of care, disease process/condition, diagnostic tests and medications  Description: Target End Date:  1-3 days or as soon as patient condition allows    Document in Patient Education    1.  Patient and family/caregiver oriented to unit, equipment, visitation policy and means for communicating concern  2.  Complete/review Learning Assessment  3.  Assess knowledge level of disease process/condition, treatment plan, diagnostic tests and medications  4.  Explain disease process/condition, treatment plan, diagnostic tests and medications  Outcome: Progressing
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
## 2022-08-20 NOTE — CARE PLAN
The patient is Watcher - Medium risk of patient condition declining or worsening    Shift Goals  Clinical Goals: pain control, rest  Patient Goals: get better and move leg  Family Goals: pain control, mobility    Progress made toward(s) clinical / shift goals:    Problem: Knowledge Deficit - Standard  Goal: Patient and family/care givers will demonstrate understanding of plan of care, disease process/condition, diagnostic tests and medications  Outcome: Progressing     Problem: Skin Integrity  Goal: Skin integrity is maintained or improved  Outcome: Progressing     Problem: Fall Risk  Goal: Patient will remain free from falls  Outcome: Progressing

## 2022-08-20 NOTE — ASSESSMENT & PLAN NOTE
8/19 Transfused 1 unit PRBC.  8/20 Iron replacement protocol.   8/22 Hb 6.6, transfused 1 unit PRBC.  Trend laboratory studies.   Transfuse 1 unit PRBC's for hemoglobin less than 7.

## 2022-08-21 ENCOUNTER — APPOINTMENT (OUTPATIENT)
Dept: RADIOLOGY | Facility: MEDICAL CENTER | Age: 67
DRG: 958 | End: 2022-08-21
Attending: SURGERY
Payer: MEDICARE

## 2022-08-21 PROBLEM — D72.825 BANDEMIA: Status: ACTIVE | Noted: 2022-08-21

## 2022-08-21 LAB
ALBUMIN SERPL BCP-MCNC: 2.6 G/DL (ref 3.2–4.9)
ALBUMIN/GLOB SERPL: 1 G/DL
ALP SERPL-CCNC: 45 U/L (ref 30–99)
ALT SERPL-CCNC: 39 U/L (ref 2–50)
ANION GAP SERPL CALC-SCNC: 8 MMOL/L (ref 7–16)
AST SERPL-CCNC: 69 U/L (ref 12–45)
BASOPHILS # BLD AUTO: 0.2 % (ref 0–1.8)
BASOPHILS # BLD: 0.02 K/UL (ref 0–0.12)
BILIRUB SERPL-MCNC: 1 MG/DL (ref 0.1–1.5)
BUN SERPL-MCNC: 11 MG/DL (ref 8–22)
CALCIUM SERPL-MCNC: 7.6 MG/DL (ref 8.5–10.5)
CHLORIDE SERPL-SCNC: 98 MMOL/L (ref 96–112)
CO2 SERPL-SCNC: 27 MMOL/L (ref 20–33)
CREAT SERPL-MCNC: 0.78 MG/DL (ref 0.5–1.4)
EOSINOPHIL # BLD AUTO: 0.07 K/UL (ref 0–0.51)
EOSINOPHIL NFR BLD: 0.6 % (ref 0–6.9)
ERYTHROCYTE [DISTWIDTH] IN BLOOD BY AUTOMATED COUNT: 57.9 FL (ref 35.9–50)
GFR SERPLBLD CREATININE-BSD FMLA CKD-EPI: 98 ML/MIN/1.73 M 2
GLOBULIN SER CALC-MCNC: 2.6 G/DL (ref 1.9–3.5)
GLUCOSE SERPL-MCNC: 138 MG/DL (ref 65–99)
HCT VFR BLD AUTO: 21.2 % (ref 42–52)
HGB BLD-MCNC: 7.3 G/DL (ref 14–18)
IMM GRANULOCYTES # BLD AUTO: 0.19 K/UL (ref 0–0.11)
IMM GRANULOCYTES NFR BLD AUTO: 1.6 % (ref 0–0.9)
IRON SATN MFR SERPL: 8 % (ref 15–55)
IRON SERPL-MCNC: 12 UG/DL (ref 50–180)
LYMPHOCYTES # BLD AUTO: 2.01 K/UL (ref 1–4.8)
LYMPHOCYTES NFR BLD: 16.6 % (ref 22–41)
MCH RBC QN AUTO: 32.2 PG (ref 27–33)
MCHC RBC AUTO-ENTMCNC: 34.4 G/DL (ref 33.7–35.3)
MCV RBC AUTO: 93.4 FL (ref 81.4–97.8)
MONOCYTES # BLD AUTO: 0.79 K/UL (ref 0–0.85)
MONOCYTES NFR BLD AUTO: 6.5 % (ref 0–13.4)
NEUTROPHILS # BLD AUTO: 9.04 K/UL (ref 1.82–7.42)
NEUTROPHILS NFR BLD: 74.5 % (ref 44–72)
NRBC # BLD AUTO: 0.03 K/UL
NRBC BLD-RTO: 0.2 /100 WBC
PLATELET # BLD AUTO: 108 K/UL (ref 164–446)
PMV BLD AUTO: 10.9 FL (ref 9–12.9)
POTASSIUM SERPL-SCNC: 4.2 MMOL/L (ref 3.6–5.5)
PROT SERPL-MCNC: 5.2 G/DL (ref 6–8.2)
RBC # BLD AUTO: 2.27 M/UL (ref 4.7–6.1)
SODIUM SERPL-SCNC: 133 MMOL/L (ref 135–145)
TIBC SERPL-MCNC: 159 UG/DL (ref 250–450)
UIBC SERPL-MCNC: 147 UG/DL (ref 110–370)
WBC # BLD AUTO: 12.1 K/UL (ref 4.8–10.8)

## 2022-08-21 PROCEDURE — 87040 BLOOD CULTURE FOR BACTERIA: CPT

## 2022-08-21 PROCEDURE — A9270 NON-COVERED ITEM OR SERVICE: HCPCS | Performed by: NURSE PRACTITIONER

## 2022-08-21 PROCEDURE — 700102 HCHG RX REV CODE 250 W/ 637 OVERRIDE(OP): Performed by: SPECIALIST

## 2022-08-21 PROCEDURE — 700102 HCHG RX REV CODE 250 W/ 637 OVERRIDE(OP): Performed by: NURSE PRACTITIONER

## 2022-08-21 PROCEDURE — 80053 COMPREHEN METABOLIC PANEL: CPT

## 2022-08-21 PROCEDURE — 99233 SBSQ HOSP IP/OBS HIGH 50: CPT | Performed by: SURGERY

## 2022-08-21 PROCEDURE — 700111 HCHG RX REV CODE 636 W/ 250 OVERRIDE (IP): Performed by: SURGERY

## 2022-08-21 PROCEDURE — 71045 X-RAY EXAM CHEST 1 VIEW: CPT

## 2022-08-21 PROCEDURE — 700101 HCHG RX REV CODE 250: Performed by: SPECIALIST

## 2022-08-21 PROCEDURE — 700111 HCHG RX REV CODE 636 W/ 250 OVERRIDE (IP): Performed by: ORTHOPAEDIC SURGERY

## 2022-08-21 PROCEDURE — 83540 ASSAY OF IRON: CPT

## 2022-08-21 PROCEDURE — 700105 HCHG RX REV CODE 258: Performed by: SURGERY

## 2022-08-21 PROCEDURE — A9270 NON-COVERED ITEM OR SERVICE: HCPCS | Performed by: SURGERY

## 2022-08-21 PROCEDURE — 94640 AIRWAY INHALATION TREATMENT: CPT

## 2022-08-21 PROCEDURE — 85025 COMPLETE CBC W/AUTO DIFF WBC: CPT

## 2022-08-21 PROCEDURE — 770022 HCHG ROOM/CARE - ICU (200)

## 2022-08-21 PROCEDURE — 83550 IRON BINDING TEST: CPT

## 2022-08-21 PROCEDURE — A9270 NON-COVERED ITEM OR SERVICE: HCPCS | Performed by: SPECIALIST

## 2022-08-21 PROCEDURE — 94669 MECHANICAL CHEST WALL OSCILL: CPT

## 2022-08-21 PROCEDURE — 700102 HCHG RX REV CODE 250 W/ 637 OVERRIDE(OP): Performed by: SURGERY

## 2022-08-21 RX ORDER — FUROSEMIDE 10 MG/ML
40 INJECTION INTRAMUSCULAR; INTRAVENOUS 2 TIMES DAILY
Status: COMPLETED | OUTPATIENT
Start: 2022-08-21 | End: 2022-08-22

## 2022-08-21 RX ADMIN — METAXALONE 400 MG: 800 TABLET ORAL at 05:07

## 2022-08-21 RX ADMIN — ACETAMINOPHEN 650 MG: 325 TABLET ORAL at 23:48

## 2022-08-21 RX ADMIN — CEFAZOLIN SODIUM 2 G: 2 INJECTION, SOLUTION INTRAVENOUS at 07:58

## 2022-08-21 RX ADMIN — GABAPENTIN 100 MG: 100 CAPSULE ORAL at 05:08

## 2022-08-21 RX ADMIN — TAMSULOSIN HYDROCHLORIDE 0.4 MG: 0.4 CAPSULE ORAL at 07:58

## 2022-08-21 RX ADMIN — ENOXAPARIN SODIUM 30 MG: 30 INJECTION SUBCUTANEOUS at 17:48

## 2022-08-21 RX ADMIN — POLYETHYLENE GLYCOL 3350 1 PACKET: 17 POWDER, FOR SOLUTION ORAL at 05:07

## 2022-08-21 RX ADMIN — SODIUM CHLORIDE 250 MG: 9 INJECTION, SOLUTION INTRAVENOUS at 17:48

## 2022-08-21 RX ADMIN — OXYCODONE HYDROCHLORIDE 10 MG: 10 TABLET ORAL at 21:37

## 2022-08-21 RX ADMIN — ACETAMINOPHEN 650 MG: 325 TABLET ORAL at 17:48

## 2022-08-21 RX ADMIN — OXYCODONE 5 MG: 5 TABLET ORAL at 02:07

## 2022-08-21 RX ADMIN — DOCUSATE SODIUM 100 MG: 100 CAPSULE, LIQUID FILLED ORAL at 05:08

## 2022-08-21 RX ADMIN — GABAPENTIN 100 MG: 100 CAPSULE ORAL at 17:48

## 2022-08-21 RX ADMIN — ACETAMINOPHEN 650 MG: 325 TABLET ORAL at 11:31

## 2022-08-21 RX ADMIN — CEFAZOLIN SODIUM 2 G: 2 INJECTION, SOLUTION INTRAVENOUS at 16:17

## 2022-08-21 RX ADMIN — OXYCODONE HYDROCHLORIDE 10 MG: 10 TABLET ORAL at 11:42

## 2022-08-21 RX ADMIN — MAGNESIUM HYDROXIDE 30 ML: 400 SUSPENSION ORAL at 05:07

## 2022-08-21 RX ADMIN — LIDOCAINE 1 PATCH: 50 PATCH TOPICAL at 23:48

## 2022-08-21 RX ADMIN — METAXALONE 400 MG: 800 TABLET ORAL at 17:48

## 2022-08-21 RX ADMIN — FUROSEMIDE 40 MG: 10 INJECTION, SOLUTION INTRAMUSCULAR; INTRAVENOUS at 11:31

## 2022-08-21 RX ADMIN — METAXALONE 400 MG: 800 TABLET ORAL at 11:31

## 2022-08-21 RX ADMIN — MORPHINE SULFATE 2 MG: 4 INJECTION INTRAVENOUS at 03:52

## 2022-08-21 RX ADMIN — FUROSEMIDE 40 MG: 10 INJECTION, SOLUTION INTRAMUSCULAR; INTRAVENOUS at 17:48

## 2022-08-21 RX ADMIN — OXYCODONE HYDROCHLORIDE 10 MG: 10 TABLET ORAL at 18:00

## 2022-08-21 RX ADMIN — ACETAMINOPHEN 650 MG: 325 TABLET ORAL at 05:08

## 2022-08-21 RX ADMIN — GABAPENTIN 100 MG: 100 CAPSULE ORAL at 11:31

## 2022-08-21 RX ADMIN — ENOXAPARIN SODIUM 30 MG: 30 INJECTION SUBCUTANEOUS at 05:07

## 2022-08-21 RX ADMIN — OXYCODONE HYDROCHLORIDE 10 MG: 10 TABLET ORAL at 05:17

## 2022-08-21 RX ADMIN — CEFAZOLIN SODIUM 2 G: 2 INJECTION, SOLUTION INTRAVENOUS at 23:48

## 2022-08-21 ASSESSMENT — PAIN DESCRIPTION - PAIN TYPE
TYPE: ACUTE PAIN

## 2022-08-21 ASSESSMENT — ENCOUNTER SYMPTOMS
NUMBNESS: 1
JOINT SWELLING: 1
ARTHRALGIAS: 1
MYALGIAS: 1
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
## 2022-08-21 NOTE — CARE PLAN
The patient is Watcher - Medium risk of patient condition declining or worsening    Shift Goals  Clinical Goals: pulmonary toileting, mobility, pain management  Patient Goals: pain control  Family Goals: patient comfort    Progress made toward(s) clinical / shift goals:  Pt educated on importance of IS use. Performs correctly once reminded to use. Pt has PRN medication for pain relief, is able to make his needs known. Mobilized to the chair and to the commode this shift. Mobilizes with one person assist.       Problem: Pain - Standard  Goal: Alleviation of pain or a reduction in pain to the patient’s comfort goal  Outcome: Progressing     Problem: Knowledge Deficit - Standard  Goal: Patient and family/care givers will demonstrate understanding of plan of care, disease process/condition, diagnostic tests and medications  Outcome: Progressing     Problem: Skin Integrity  Goal: Skin integrity is maintained or improved  Outcome: Progressing     Problem: Fall Risk  Goal: Patient will remain free from falls  Outcome: Progressing     Problem: Early Mobilization - Post Surgery  Goal: Early mobilization post surgery  Outcome: Progressing     Problem: Bowel Elimination - Post Surgical  Goal: Patient will resume regular bowel sounds and function with no discomfort or distention  Outcome: Progressing       Patient is not progressing towards the following goals:

## 2022-08-21 NOTE — ASSESSMENT & PLAN NOTE
WBC up.  8/21 Blood cultures x2  Some progression of bilateral patchy pulmonary infiltrates the work of breathing oxygen requirements and vital signs are stable  8/22 Completing Ancef therapy  8/25 Leukocytosis.  No high temps.  CXR: edema vs infiltrates. Observation.    8/26 Persistent leukocytosis.  Blood and sputum cultures.  Empiric antibiotics vancomycin and cefepime initiated.  8/27 MRSA swab by PCR negative.  Vancomycin discontinued.    8/29 Cefepime day 4 of 7.   - Transition to Augmentin for duration.

## 2022-08-22 ENCOUNTER — APPOINTMENT (OUTPATIENT)
Dept: RADIOLOGY | Facility: MEDICAL CENTER | Age: 67
DRG: 958 | End: 2022-08-22
Attending: SURGERY
Payer: MEDICARE

## 2022-08-22 LAB
ABO GROUP BLD: NORMAL
ALBUMIN SERPL BCP-MCNC: 2.3 G/DL (ref 3.2–4.9)
ALBUMIN/GLOB SERPL: 0.8 G/DL
ALP SERPL-CCNC: 51 U/L (ref 30–99)
ALT SERPL-CCNC: 26 U/L (ref 2–50)
ANION GAP SERPL CALC-SCNC: 8 MMOL/L (ref 7–16)
AST SERPL-CCNC: 44 U/L (ref 12–45)
BARCODED ABORH UBTYP: 5100
BARCODED PRD CODE UBPRD: NORMAL
BARCODED UNIT NUM UBUNT: NORMAL
BASOPHILS # BLD AUTO: 0.3 % (ref 0–1.8)
BASOPHILS # BLD: 0.04 K/UL (ref 0–0.12)
BILIRUB SERPL-MCNC: 0.9 MG/DL (ref 0.1–1.5)
BLD GP AB SCN SERPL QL: NORMAL
BUN SERPL-MCNC: 12 MG/DL (ref 8–22)
CALCIUM SERPL-MCNC: 7.4 MG/DL (ref 8.5–10.5)
CHLORIDE SERPL-SCNC: 95 MMOL/L (ref 96–112)
CO2 SERPL-SCNC: 27 MMOL/L (ref 20–33)
COMPONENT R 8504R: NORMAL
CREAT SERPL-MCNC: 0.8 MG/DL (ref 0.5–1.4)
EOSINOPHIL # BLD AUTO: 0.09 K/UL (ref 0–0.51)
EOSINOPHIL NFR BLD: 0.7 % (ref 0–6.9)
ERYTHROCYTE [DISTWIDTH] IN BLOOD BY AUTOMATED COUNT: 54.8 FL (ref 35.9–50)
GFR SERPLBLD CREATININE-BSD FMLA CKD-EPI: 98 ML/MIN/1.73 M 2
GLOBULIN SER CALC-MCNC: 2.9 G/DL (ref 1.9–3.5)
GLUCOSE SERPL-MCNC: 122 MG/DL (ref 65–99)
HCT VFR BLD AUTO: 19.5 % (ref 42–52)
HCT VFR BLD AUTO: 24.1 % (ref 42–52)
HGB BLD-MCNC: 6.6 G/DL (ref 14–18)
HGB BLD-MCNC: 8.1 G/DL (ref 14–18)
IMM GRANULOCYTES # BLD AUTO: 0.24 K/UL (ref 0–0.11)
IMM GRANULOCYTES NFR BLD AUTO: 2 % (ref 0–0.9)
LYMPHOCYTES # BLD AUTO: 1.45 K/UL (ref 1–4.8)
LYMPHOCYTES NFR BLD: 12 % (ref 22–41)
MCH RBC QN AUTO: 31.6 PG (ref 27–33)
MCHC RBC AUTO-ENTMCNC: 33.8 G/DL (ref 33.7–35.3)
MCV RBC AUTO: 93.3 FL (ref 81.4–97.8)
MONOCYTES # BLD AUTO: 0.78 K/UL (ref 0–0.85)
MONOCYTES NFR BLD AUTO: 6.5 % (ref 0–13.4)
NEUTROPHILS # BLD AUTO: 9.49 K/UL (ref 1.82–7.42)
NEUTROPHILS NFR BLD: 78.5 % (ref 44–72)
NRBC # BLD AUTO: 0.03 K/UL
NRBC BLD-RTO: 0.2 /100 WBC
PLATELET # BLD AUTO: 127 K/UL (ref 164–446)
PMV BLD AUTO: 10.5 FL (ref 9–12.9)
POTASSIUM SERPL-SCNC: 4.1 MMOL/L (ref 3.6–5.5)
PRODUCT TYPE UPROD: NORMAL
PROT SERPL-MCNC: 5.2 G/DL (ref 6–8.2)
RBC # BLD AUTO: 2.09 M/UL (ref 4.7–6.1)
RH BLD: NORMAL
SODIUM SERPL-SCNC: 130 MMOL/L (ref 135–145)
UNIT STATUS USTAT: NORMAL
WBC # BLD AUTO: 12.1 K/UL (ref 4.8–10.8)

## 2022-08-22 PROCEDURE — 99291 CRITICAL CARE FIRST HOUR: CPT | Performed by: SURGERY

## 2022-08-22 PROCEDURE — 700102 HCHG RX REV CODE 250 W/ 637 OVERRIDE(OP): Performed by: SURGERY

## 2022-08-22 PROCEDURE — A9270 NON-COVERED ITEM OR SERVICE: HCPCS | Performed by: SURGERY

## 2022-08-22 PROCEDURE — 700102 HCHG RX REV CODE 250 W/ 637 OVERRIDE(OP): Performed by: NURSE PRACTITIONER

## 2022-08-22 PROCEDURE — 700111 HCHG RX REV CODE 636 W/ 250 OVERRIDE (IP): Performed by: ORTHOPAEDIC SURGERY

## 2022-08-22 PROCEDURE — 86850 RBC ANTIBODY SCREEN: CPT

## 2022-08-22 PROCEDURE — 85018 HEMOGLOBIN: CPT

## 2022-08-22 PROCEDURE — A9270 NON-COVERED ITEM OR SERVICE: HCPCS | Performed by: NURSE PRACTITIONER

## 2022-08-22 PROCEDURE — 700102 HCHG RX REV CODE 250 W/ 637 OVERRIDE(OP): Performed by: SPECIALIST

## 2022-08-22 PROCEDURE — 85014 HEMATOCRIT: CPT

## 2022-08-22 PROCEDURE — 86900 BLOOD TYPING SEROLOGIC ABO: CPT

## 2022-08-22 PROCEDURE — 700105 HCHG RX REV CODE 258: Performed by: SURGERY

## 2022-08-22 PROCEDURE — 86901 BLOOD TYPING SEROLOGIC RH(D): CPT

## 2022-08-22 PROCEDURE — 71045 X-RAY EXAM CHEST 1 VIEW: CPT

## 2022-08-22 PROCEDURE — A9270 NON-COVERED ITEM OR SERVICE: HCPCS | Performed by: SPECIALIST

## 2022-08-22 PROCEDURE — P9016 RBC LEUKOCYTES REDUCED: HCPCS

## 2022-08-22 PROCEDURE — 86923 COMPATIBILITY TEST ELECTRIC: CPT

## 2022-08-22 PROCEDURE — 80053 COMPREHEN METABOLIC PANEL: CPT

## 2022-08-22 PROCEDURE — 36430 TRANSFUSION BLD/BLD COMPNT: CPT

## 2022-08-22 PROCEDURE — 700111 HCHG RX REV CODE 636 W/ 250 OVERRIDE (IP): Performed by: NURSE PRACTITIONER

## 2022-08-22 PROCEDURE — 770022 HCHG ROOM/CARE - ICU (200)

## 2022-08-22 PROCEDURE — 94669 MECHANICAL CHEST WALL OSCILL: CPT

## 2022-08-22 PROCEDURE — 700111 HCHG RX REV CODE 636 W/ 250 OVERRIDE (IP): Performed by: SURGERY

## 2022-08-22 PROCEDURE — 85025 COMPLETE CBC W/AUTO DIFF WBC: CPT

## 2022-08-22 PROCEDURE — 94640 AIRWAY INHALATION TREATMENT: CPT

## 2022-08-22 RX ORDER — FUROSEMIDE 10 MG/ML
40 INJECTION INTRAMUSCULAR; INTRAVENOUS
Status: COMPLETED | OUTPATIENT
Start: 2022-08-22 | End: 2022-08-23

## 2022-08-22 RX ORDER — POTASSIUM CHLORIDE 20 MEQ/1
40 TABLET, EXTENDED RELEASE ORAL 2 TIMES DAILY
Status: DISCONTINUED | OUTPATIENT
Start: 2022-08-22 | End: 2022-08-27

## 2022-08-22 RX ORDER — POTASSIUM CHLORIDE 20 MEQ/1
40 TABLET, EXTENDED RELEASE ORAL DAILY
Status: DISCONTINUED | OUTPATIENT
Start: 2022-08-22 | End: 2022-08-22

## 2022-08-22 RX ADMIN — FUROSEMIDE 40 MG: 10 INJECTION, SOLUTION INTRAMUSCULAR; INTRAVENOUS at 10:20

## 2022-08-22 RX ADMIN — GABAPENTIN 100 MG: 100 CAPSULE ORAL at 11:25

## 2022-08-22 RX ADMIN — OXYCODONE HYDROCHLORIDE 10 MG: 10 TABLET ORAL at 19:42

## 2022-08-22 RX ADMIN — FUROSEMIDE 40 MG: 10 INJECTION, SOLUTION INTRAMUSCULAR; INTRAVENOUS at 17:34

## 2022-08-22 RX ADMIN — OXYCODONE HYDROCHLORIDE 10 MG: 10 TABLET ORAL at 05:43

## 2022-08-22 RX ADMIN — METAXALONE 400 MG: 800 TABLET ORAL at 17:34

## 2022-08-22 RX ADMIN — POTASSIUM CHLORIDE 40 MEQ: 1500 TABLET, EXTENDED RELEASE ORAL at 10:20

## 2022-08-22 RX ADMIN — POTASSIUM CHLORIDE 40 MEQ: 20 TABLET, EXTENDED RELEASE ORAL at 17:34

## 2022-08-22 RX ADMIN — ACETAMINOPHEN 650 MG: 325 TABLET ORAL at 05:44

## 2022-08-22 RX ADMIN — GABAPENTIN 100 MG: 100 CAPSULE ORAL at 05:44

## 2022-08-22 RX ADMIN — CEFAZOLIN SODIUM 2 G: 2 INJECTION, SOLUTION INTRAVENOUS at 08:31

## 2022-08-22 RX ADMIN — METAXALONE 400 MG: 800 TABLET ORAL at 11:24

## 2022-08-22 RX ADMIN — ACETAMINOPHEN 650 MG: 325 TABLET ORAL at 11:25

## 2022-08-22 RX ADMIN — DOCUSATE SODIUM 100 MG: 100 CAPSULE, LIQUID FILLED ORAL at 17:34

## 2022-08-22 RX ADMIN — POLYETHYLENE GLYCOL 3350 1 PACKET: 17 POWDER, FOR SOLUTION ORAL at 17:34

## 2022-08-22 RX ADMIN — OXYCODONE HYDROCHLORIDE 10 MG: 10 TABLET ORAL at 01:05

## 2022-08-22 RX ADMIN — TAMSULOSIN HYDROCHLORIDE 0.4 MG: 0.4 CAPSULE ORAL at 08:31

## 2022-08-22 RX ADMIN — DOCUSATE SODIUM 50 MG AND SENNOSIDES 8.6 MG 1 TABLET: 8.6; 5 TABLET, FILM COATED ORAL at 20:57

## 2022-08-22 RX ADMIN — METAXALONE 400 MG: 800 TABLET ORAL at 05:44

## 2022-08-22 RX ADMIN — OXYCODONE HYDROCHLORIDE 10 MG: 10 TABLET ORAL at 11:25

## 2022-08-22 RX ADMIN — SODIUM CHLORIDE 250 MG: 9 INJECTION, SOLUTION INTRAVENOUS at 06:07

## 2022-08-22 RX ADMIN — FUROSEMIDE 40 MG: 10 INJECTION, SOLUTION INTRAMUSCULAR; INTRAVENOUS at 05:44

## 2022-08-22 RX ADMIN — GABAPENTIN 100 MG: 100 CAPSULE ORAL at 17:34

## 2022-08-22 RX ADMIN — ACETAMINOPHEN 650 MG: 325 TABLET ORAL at 17:33

## 2022-08-22 RX ADMIN — SODIUM CHLORIDE 250 MG: 9 INJECTION, SOLUTION INTRAVENOUS at 17:34

## 2022-08-22 ASSESSMENT — PAIN DESCRIPTION - PAIN TYPE
TYPE: ACUTE PAIN

## 2022-08-22 ASSESSMENT — PAIN SCALES - WONG BAKER: WONGBAKER_NUMERICALRESPONSE: HURTS JUST A LITTLE BIT

## 2022-08-22 ASSESSMENT — PATIENT HEALTH QUESTIONNAIRE - PHQ9
2. FEELING DOWN, DEPRESSED, IRRITABLE, OR HOPELESS: NOT AT ALL
1. LITTLE INTEREST OR PLEASURE IN DOING THINGS: NOT AT ALL
SUM OF ALL RESPONSES TO PHQ9 QUESTIONS 1 AND 2: 0

## 2022-08-22 ASSESSMENT — PAIN SCALES - PAIN ASSESSMENT IN ADVANCED DEMENTIA (PAINAD)
NEGVOCALIZATION: OCCASIONAL MOAN/GROAN, LOW SPEECH, NEGATIVE/DISAPPROVING QUALITY
CONSOLABILITY: NO NEED TO CONSOLE
BODYLANGUAGE: RELAXED

## 2022-08-22 NOTE — CARE PLAN
The patient is Watcher - Medium risk of patient condition declining or worsening    Shift Goals  Clinical Goals: pain management, pulmonary toileting  Patient Goals: pain control  Family Goals: patient comfort    Progress made toward(s) clinical / shift goals:  PT is requiring decreased PRN pain medications for comfort, increased self activity. Skin remains unchanged, dressings clean dry and intact.     Patient is not progressing towards the following goals:      Problem: Pain - Standard  Goal: Alleviation of pain or a reduction in pain to the patient’s comfort goal  8/22/2022 0412 by Laurita Singh, R.N.  Outcome: Progressing  8/21/2022 2036 by Laurita Singh R.N.  Outcome: Not Met

## 2022-08-22 NOTE — CARE PLAN
The patient is Watcher - Medium risk of patient condition declining or worsening    Shift Goals  Clinical Goals: pulmonary toileting, pain mangement, stable oxygenation  Patient Goals: pain control  Family Goals: patient comfort    Progress made toward(s) clinical / shift goals:  Pt is currently requiring high flow oxygen at 40L and 80% O2. Encouraged to use IS, cough, and breathe deeply. Needs frequent reminders.       Problem: Pain - Standard  Goal: Alleviation of pain or a reduction in pain to the patient’s comfort goal  Outcome: Progressing     Problem: Knowledge Deficit - Standard  Goal: Patient and family/care givers will demonstrate understanding of plan of care, disease process/condition, diagnostic tests and medications  Outcome: Progressing     Problem: Skin Integrity  Goal: Skin integrity is maintained or improved  Outcome: Progressing     Problem: Fall Risk  Goal: Patient will remain free from falls  Outcome: Progressing     Problem: Neurovascular Monitoring  Goal: Patient's neurovascular status will be maintained or improve  Outcome: Progressing     Problem: Early Mobilization - Post Surgery  Goal: Early mobilization post surgery  Outcome: Progressing     Problem: Bowel Elimination - Post Surgical  Goal: Patient will resume regular bowel sounds and function with no discomfort or distention  Outcome: Progressing       Patient is not progressing towards the following goals:
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
## 2022-08-22 NOTE — CARE PLAN
Problem: Hyperinflation  Goal: Prevent or improve atelectasis  Description: Target End Date:  3 to 4 days    1. Instruct incentive spirometry usage  2.  Perform hyperinflation therapy as indicated  Outcome: Progressing       PEP QID d/t chest trauma  Remains on 5L O2
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
## 2022-08-23 ENCOUNTER — APPOINTMENT (OUTPATIENT)
Dept: RADIOLOGY | Facility: MEDICAL CENTER | Age: 67
DRG: 958 | End: 2022-08-23
Attending: ORTHOPAEDIC SURGERY
Payer: MEDICARE

## 2022-08-23 ENCOUNTER — APPOINTMENT (OUTPATIENT)
Dept: CARDIOLOGY | Facility: MEDICAL CENTER | Age: 67
DRG: 958 | End: 2022-08-23
Attending: NURSE PRACTITIONER
Payer: MEDICARE

## 2022-08-23 ENCOUNTER — APPOINTMENT (OUTPATIENT)
Dept: RADIOLOGY | Facility: MEDICAL CENTER | Age: 67
DRG: 958 | End: 2022-08-23
Attending: SURGERY
Payer: MEDICARE

## 2022-08-23 PROBLEM — Z78.9 NO CONTRAINDICATION TO DEEP VEIN THROMBOSIS (DVT) PROPHYLAXIS: Status: ACTIVE | Noted: 2022-08-17

## 2022-08-23 LAB
ALBUMIN SERPL BCP-MCNC: 2.7 G/DL (ref 3.2–4.9)
ALBUMIN/GLOB SERPL: 0.9 G/DL
ALP SERPL-CCNC: 64 U/L (ref 30–99)
ALT SERPL-CCNC: 19 U/L (ref 2–50)
ANION GAP SERPL CALC-SCNC: 7 MMOL/L (ref 7–16)
AST SERPL-CCNC: 37 U/L (ref 12–45)
BASOPHILS # BLD AUTO: 0.2 % (ref 0–1.8)
BASOPHILS # BLD: 0.03 K/UL (ref 0–0.12)
BILIRUB SERPL-MCNC: 1.1 MG/DL (ref 0.1–1.5)
BUN SERPL-MCNC: 17 MG/DL (ref 8–22)
CALCIUM SERPL-MCNC: 7.7 MG/DL (ref 8.5–10.5)
CHLORIDE SERPL-SCNC: 95 MMOL/L (ref 96–112)
CO2 SERPL-SCNC: 29 MMOL/L (ref 20–33)
CREAT SERPL-MCNC: 0.89 MG/DL (ref 0.5–1.4)
EOSINOPHIL # BLD AUTO: 0.07 K/UL (ref 0–0.51)
EOSINOPHIL NFR BLD: 0.5 % (ref 0–6.9)
ERYTHROCYTE [DISTWIDTH] IN BLOOD BY AUTOMATED COUNT: 56.5 FL (ref 35.9–50)
GFR SERPLBLD CREATININE-BSD FMLA CKD-EPI: 95 ML/MIN/1.73 M 2
GLOBULIN SER CALC-MCNC: 2.9 G/DL (ref 1.9–3.5)
GLUCOSE SERPL-MCNC: 124 MG/DL (ref 65–99)
HCT VFR BLD AUTO: 23.1 % (ref 42–52)
HGB BLD-MCNC: 7.8 G/DL (ref 14–18)
IMM GRANULOCYTES # BLD AUTO: 0.59 K/UL (ref 0–0.11)
IMM GRANULOCYTES NFR BLD AUTO: 4.1 % (ref 0–0.9)
LV EJECT FRACT  99904: 60
LV EJECT FRACT MOD 2C 99903: 56.48
LV EJECT FRACT MOD 4C 99902: 62.28
LV EJECT FRACT MOD BP 99901: 59.15
LYMPHOCYTES # BLD AUTO: 1.39 K/UL (ref 1–4.8)
LYMPHOCYTES NFR BLD: 9.8 % (ref 22–41)
MCH RBC QN AUTO: 31.5 PG (ref 27–33)
MCHC RBC AUTO-ENTMCNC: 33.8 G/DL (ref 33.7–35.3)
MCV RBC AUTO: 93.1 FL (ref 81.4–97.8)
MONOCYTES # BLD AUTO: 0.85 K/UL (ref 0–0.85)
MONOCYTES NFR BLD AUTO: 6 % (ref 0–13.4)
NEUTROPHILS # BLD AUTO: 11.29 K/UL (ref 1.82–7.42)
NEUTROPHILS NFR BLD: 79.4 % (ref 44–72)
NRBC # BLD AUTO: 0.06 K/UL
NRBC BLD-RTO: 0.4 /100 WBC
NT-PROBNP SERPL IA-MCNC: 233 PG/ML (ref 0–125)
PLATELET # BLD AUTO: 144 K/UL (ref 164–446)
PMV BLD AUTO: 10.4 FL (ref 9–12.9)
POTASSIUM SERPL-SCNC: 4.6 MMOL/L (ref 3.6–5.5)
PROT SERPL-MCNC: 5.6 G/DL (ref 6–8.2)
RBC # BLD AUTO: 2.48 M/UL (ref 4.7–6.1)
SODIUM SERPL-SCNC: 131 MMOL/L (ref 135–145)
WBC # BLD AUTO: 14.2 K/UL (ref 4.8–10.8)

## 2022-08-23 PROCEDURE — 700102 HCHG RX REV CODE 250 W/ 637 OVERRIDE(OP): Performed by: SURGERY

## 2022-08-23 PROCEDURE — 83880 ASSAY OF NATRIURETIC PEPTIDE: CPT

## 2022-08-23 PROCEDURE — A9270 NON-COVERED ITEM OR SERVICE: HCPCS | Performed by: NURSE PRACTITIONER

## 2022-08-23 PROCEDURE — 700101 HCHG RX REV CODE 250: Performed by: SPECIALIST

## 2022-08-23 PROCEDURE — A9270 NON-COVERED ITEM OR SERVICE: HCPCS | Performed by: SPECIALIST

## 2022-08-23 PROCEDURE — 99233 SBSQ HOSP IP/OBS HIGH 50: CPT | Performed by: SURGERY

## 2022-08-23 PROCEDURE — 80053 COMPREHEN METABOLIC PANEL: CPT

## 2022-08-23 PROCEDURE — 94669 MECHANICAL CHEST WALL OSCILL: CPT

## 2022-08-23 PROCEDURE — 97163 PT EVAL HIGH COMPLEX 45 MIN: CPT

## 2022-08-23 PROCEDURE — 93306 TTE W/DOPPLER COMPLETE: CPT | Mod: 26 | Performed by: INTERNAL MEDICINE

## 2022-08-23 PROCEDURE — 700111 HCHG RX REV CODE 636 W/ 250 OVERRIDE (IP): Performed by: SURGERY

## 2022-08-23 PROCEDURE — 93306 TTE W/DOPPLER COMPLETE: CPT

## 2022-08-23 PROCEDURE — A9270 NON-COVERED ITEM OR SERVICE: HCPCS | Performed by: SURGERY

## 2022-08-23 PROCEDURE — 700105 HCHG RX REV CODE 258: Performed by: SURGERY

## 2022-08-23 PROCEDURE — 94640 AIRWAY INHALATION TREATMENT: CPT

## 2022-08-23 PROCEDURE — 700102 HCHG RX REV CODE 250 W/ 637 OVERRIDE(OP): Performed by: NURSE PRACTITIONER

## 2022-08-23 PROCEDURE — 71045 X-RAY EXAM CHEST 1 VIEW: CPT

## 2022-08-23 PROCEDURE — 72190 X-RAY EXAM OF PELVIS: CPT

## 2022-08-23 PROCEDURE — 97535 SELF CARE MNGMENT TRAINING: CPT

## 2022-08-23 PROCEDURE — 770022 HCHG ROOM/CARE - ICU (200)

## 2022-08-23 PROCEDURE — 700111 HCHG RX REV CODE 636 W/ 250 OVERRIDE (IP): Performed by: NURSE PRACTITIONER

## 2022-08-23 PROCEDURE — 85025 COMPLETE CBC W/AUTO DIFF WBC: CPT

## 2022-08-23 PROCEDURE — 97530 THERAPEUTIC ACTIVITIES: CPT

## 2022-08-23 PROCEDURE — 700102 HCHG RX REV CODE 250 W/ 637 OVERRIDE(OP): Performed by: SPECIALIST

## 2022-08-23 PROCEDURE — 97167 OT EVAL HIGH COMPLEX 60 MIN: CPT

## 2022-08-23 RX ADMIN — METAXALONE 400 MG: 800 TABLET ORAL at 11:33

## 2022-08-23 RX ADMIN — ENOXAPARIN SODIUM 30 MG: 30 INJECTION SUBCUTANEOUS at 06:23

## 2022-08-23 RX ADMIN — GABAPENTIN 100 MG: 100 CAPSULE ORAL at 11:33

## 2022-08-23 RX ADMIN — OXYCODONE HYDROCHLORIDE 10 MG: 10 TABLET ORAL at 01:04

## 2022-08-23 RX ADMIN — MAGNESIUM HYDROXIDE 30 ML: 400 SUSPENSION ORAL at 06:22

## 2022-08-23 RX ADMIN — OXYCODONE HYDROCHLORIDE 10 MG: 10 TABLET ORAL at 04:35

## 2022-08-23 RX ADMIN — SODIUM CHLORIDE 250 MG: 9 INJECTION, SOLUTION INTRAVENOUS at 06:24

## 2022-08-23 RX ADMIN — METAXALONE 400 MG: 800 TABLET ORAL at 06:23

## 2022-08-23 RX ADMIN — METAXALONE 400 MG: 800 TABLET ORAL at 17:21

## 2022-08-23 RX ADMIN — DOCUSATE SODIUM 50 MG AND SENNOSIDES 8.6 MG 1 TABLET: 8.6; 5 TABLET, FILM COATED ORAL at 21:04

## 2022-08-23 RX ADMIN — DOCUSATE SODIUM 100 MG: 100 CAPSULE, LIQUID FILLED ORAL at 17:20

## 2022-08-23 RX ADMIN — POTASSIUM CHLORIDE 40 MEQ: 20 TABLET, EXTENDED RELEASE ORAL at 17:20

## 2022-08-23 RX ADMIN — GABAPENTIN 100 MG: 100 CAPSULE ORAL at 06:24

## 2022-08-23 RX ADMIN — ACETAMINOPHEN 650 MG: 325 TABLET ORAL at 06:23

## 2022-08-23 RX ADMIN — ACETAMINOPHEN 650 MG: 325 TABLET ORAL at 17:20

## 2022-08-23 RX ADMIN — POTASSIUM CHLORIDE 40 MEQ: 20 TABLET, EXTENDED RELEASE ORAL at 06:23

## 2022-08-23 RX ADMIN — ACETAMINOPHEN 650 MG: 325 TABLET ORAL at 23:51

## 2022-08-23 RX ADMIN — OXYCODONE HYDROCHLORIDE 10 MG: 10 TABLET ORAL at 17:21

## 2022-08-23 RX ADMIN — POLYETHYLENE GLYCOL 3350 1 PACKET: 17 POWDER, FOR SOLUTION ORAL at 17:21

## 2022-08-23 RX ADMIN — LIDOCAINE 1 PATCH: 50 PATCH TOPICAL at 23:51

## 2022-08-23 RX ADMIN — OXYCODONE HYDROCHLORIDE 10 MG: 10 TABLET ORAL at 10:32

## 2022-08-23 RX ADMIN — ENOXAPARIN SODIUM 30 MG: 30 INJECTION SUBCUTANEOUS at 17:21

## 2022-08-23 RX ADMIN — TAMSULOSIN HYDROCHLORIDE 0.4 MG: 0.4 CAPSULE ORAL at 07:50

## 2022-08-23 RX ADMIN — FUROSEMIDE 40 MG: 10 INJECTION, SOLUTION INTRAMUSCULAR; INTRAVENOUS at 06:24

## 2022-08-23 RX ADMIN — GABAPENTIN 100 MG: 100 CAPSULE ORAL at 17:21

## 2022-08-23 RX ADMIN — OXYCODONE HYDROCHLORIDE 10 MG: 10 TABLET ORAL at 20:14

## 2022-08-23 RX ADMIN — OXYCODONE HYDROCHLORIDE 10 MG: 10 TABLET ORAL at 13:56

## 2022-08-23 RX ADMIN — FUROSEMIDE 40 MG: 10 INJECTION, SOLUTION INTRAMUSCULAR; INTRAVENOUS at 17:21

## 2022-08-23 RX ADMIN — LIDOCAINE 1 PATCH: 50 PATCH TOPICAL at 00:31

## 2022-08-23 RX ADMIN — ACETAMINOPHEN 650 MG: 325 TABLET ORAL at 11:33

## 2022-08-23 ASSESSMENT — COPD QUESTIONNAIRES
DURING THE PAST 4 WEEKS HOW MUCH DID YOU FEEL SHORT OF BREATH: NONE/LITTLE OF THE TIME
HAVE YOU SMOKED AT LEAST 100 CIGARETTES IN YOUR ENTIRE LIFE: YES
DO YOU EVER COUGH UP ANY MUCUS OR PHLEGM?: NO/ONLY WITH OCCASIONAL COLDS OR INFECTIONS
COPD SCREENING SCORE: 5

## 2022-08-23 ASSESSMENT — PAIN SCALES - WONG BAKER
WONGBAKER_NUMERICALRESPONSE: HURTS JUST A LITTLE BIT
WONGBAKER_NUMERICALRESPONSE: HURTS JUST A LITTLE BIT
WONGBAKER_NUMERICALRESPONSE: HURTS EVEN MORE
WONGBAKER_NUMERICALRESPONSE: HURTS A LITTLE MORE
WONGBAKER_NUMERICALRESPONSE: HURTS EVEN MORE
WONGBAKER_NUMERICALRESPONSE: HURTS JUST A LITTLE BIT
WONGBAKER_NUMERICALRESPONSE: HURTS JUST A LITTLE BIT

## 2022-08-23 ASSESSMENT — PAIN DESCRIPTION - PAIN TYPE
TYPE: ACUTE PAIN

## 2022-08-23 ASSESSMENT — COGNITIVE AND FUNCTIONAL STATUS - GENERAL
DRESSING REGULAR UPPER BODY CLOTHING: A LOT
TURNING FROM BACK TO SIDE WHILE IN FLAT BAD: A LOT
MOVING TO AND FROM BED TO CHAIR: A LOT
HELP NEEDED FOR BATHING: A LOT
CLIMB 3 TO 5 STEPS WITH RAILING: TOTAL
DAILY ACTIVITIY SCORE: 13
STANDING UP FROM CHAIR USING ARMS: A LOT
TOILETING: A LOT
WALKING IN HOSPITAL ROOM: TOTAL
SUGGESTED CMS G CODE MODIFIER DAILY ACTIVITY: CL
MOVING FROM LYING ON BACK TO SITTING ON SIDE OF FLAT BED: UNABLE
SUGGESTED CMS G CODE MODIFIER MOBILITY: CM
EATING MEALS: A LITTLE
DRESSING REGULAR LOWER BODY CLOTHING: A LOT
PERSONAL GROOMING: A LOT
MOBILITY SCORE: 9

## 2022-08-23 ASSESSMENT — PAIN SCALES - GENERAL: PAIN_LEVEL: 6

## 2022-08-23 ASSESSMENT — FIBROSIS 4 INDEX: FIB4 SCORE: 3.17

## 2022-08-23 ASSESSMENT — ACTIVITIES OF DAILY LIVING (ADL): TOILETING: INDEPENDENT

## 2022-08-23 ASSESSMENT — GAIT ASSESSMENTS: GAIT LEVEL OF ASSIST: UNABLE TO PARTICIPATE
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
## 2022-08-23 NOTE — CARE PLAN
The patient is Watcher - Medium risk of patient condition declining or worsening    Shift Goals  Clinical Goals: pulmonary toileting, mobility  Patient Goals: restfull nigth  Family Goals: patient comfort    Progress made toward(s) clinical / shift goals:  Pt mobilized to the chair, the commode, back to the chair and then back to bed. Mobilized with 2 person assist. Encouraged to deep breathe and use IS.       Problem: Pain - Standard  Goal: Alleviation of pain or a reduction in pain to the patient’s comfort goal  Outcome: Progressing     Problem: Knowledge Deficit - Standard  Goal: Patient and family/care givers will demonstrate understanding of plan of care, disease process/condition, diagnostic tests and medications  Outcome: Progressing     Problem: Skin Integrity  Goal: Skin integrity is maintained or improved  Outcome: Progressing     Problem: Fall Risk  Goal: Patient will remain free from falls  Outcome: Progressing     Problem: Neurovascular Monitoring  Goal: Patient's neurovascular status will be maintained or improve  Outcome: Progressing     Problem: Early Mobilization - Post Surgery  Goal: Early mobilization post surgery  Outcome: Progressing     Problem: Bowel Elimination - Post Surgical  Goal: Patient will resume regular bowel sounds and function with no discomfort or distention  Outcome: Progressing       Patient is not progressing towards the following goals:

## 2022-08-23 NOTE — CARE PLAN
The patient is Watcher - Medium risk of patient condition declining or worsening    Shift Goals  Clinical Goals: pulmonary toileting, pain mangement, stable oxygenation  Patient Goals: restfull nigsamantha  Family Goals: patient comfort    Progress made toward(s) clinical / shift goals:    Problem: Knowledge Deficit - Standard  Goal: Patient and family/care givers will demonstrate understanding of plan of care, disease process/condition, diagnostic tests and medications  Outcome: Progressing     Problem: Skin Integrity  Goal: Skin integrity is maintained or improved  Outcome: Progressing     Problem: Fall Risk  Goal: Patient will remain free from falls  Outcome: Progressing     Problem: Pain - Standard  Goal: Alleviation of pain or a reduction in pain to the patient’s comfort goal  Outcome: Progressing     Problem: Early Mobilization - Post Surgery  Goal: Early mobilization post surgery  Outcome: Progressing

## 2022-08-23 NOTE — ANESTHESIA POSTPROCEDURE EVALUATION
Patient: Ezekiel Sanz    Procedure Summary     Date: 08/17/22 Room / Location: [place] [address] / SURGERY [facility]    Anesthesia Start: 1755 Anesthesia Stop: 2032    Procedures:       INSERTION, ENDOVASCULAR STENT GRAFT, AORTA, ABDOMINAL - THORACIC (Groin)      APPLICATION, EXTERNAL FIXATION DEVICE (Left: Ankle) Diagnosis: (Traumatic thoracic aortic injury, open left tibia fracture)    Surgeons: Gilbert Lawrence M.D.; Darius Back M.D. Responsible Provider: Tomás Bailey M.D.    Anesthesia Type: general ASA Status: 4          Final Anesthesia Type: general  Last vitals  BP   Blood Pressure : (!) 149/68    Temp   37.2 °C (99 °F)    Pulse   91   Resp   (!) 24    SpO2   94 %      Anesthesia Post Evaluation    Patient location during evaluation: PACU  Patient participation: complete - patient participated  Level of consciousness: sleepy but conscious  Pain score: 6    Airway patency: patent  Anesthetic complications: no  Cardiovascular status: hemodynamically stable  Respiratory status: acceptable  Hydration status: euvolemic    PONV: none          There were no known notable events for this encounter.     Nurse Pain Score: 8 (NPRS)
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
## 2022-08-23 NOTE — CARE PLAN
Problem: Hyperinflation  Goal: Prevent or improve atelectasis  Description: Target End Date:  3 to 4 days    1. Instruct incentive spirometry usage  2.  Perform hyperinflation therapy as indicated  Flowsheets  Taken 8/23/2022 1510  Hyperinflation Protocol Goals/Outcome: Stable Vital Capacity x24 hrs and Patient Understands / uses I.S.  Taken 8/23/2022 1125  Hyperinflation Protocol Indications: Chest Trauma (Blunt, Penetrative, Crushing, or Surgical)     Problem: Humidified High Flow Nasal Cannula  Goal: Maintain adequate oxygenation dependent on patient condition  Description: Target End Date:  resolve prior to discharge or when underlying condition is resolved/stabilized    1.  Implement humidified high flow oxygen therapy  2.  Titrate high flow oxygen to maintain appropriate SpO2  Flowsheets (Taken 8/23/2022 1100)  O2 (LPM): 30  FiO2%: 70 %    PEP QID 1500 on IS

## 2022-08-24 ENCOUNTER — APPOINTMENT (OUTPATIENT)
Dept: RADIOLOGY | Facility: MEDICAL CENTER | Age: 67
DRG: 958 | End: 2022-08-24
Attending: NURSE PRACTITIONER
Payer: MEDICARE

## 2022-08-24 ENCOUNTER — APPOINTMENT (OUTPATIENT)
Dept: RADIOLOGY | Facility: MEDICAL CENTER | Age: 67
DRG: 958 | End: 2022-08-24
Attending: SURGERY
Payer: MEDICARE

## 2022-08-24 LAB
ALBUMIN SERPL BCP-MCNC: 2.6 G/DL (ref 3.2–4.9)
ALBUMIN/GLOB SERPL: 0.9 G/DL
ALP SERPL-CCNC: 71 U/L (ref 30–99)
ALT SERPL-CCNC: 25 U/L (ref 2–50)
ANION GAP SERPL CALC-SCNC: 8 MMOL/L (ref 7–16)
ANISOCYTOSIS BLD QL SMEAR: ABNORMAL
AST SERPL-CCNC: 44 U/L (ref 12–45)
BASOPHILS # BLD AUTO: 0 % (ref 0–1.8)
BASOPHILS # BLD: 0 K/UL (ref 0–0.12)
BILIRUB SERPL-MCNC: 1 MG/DL (ref 0.1–1.5)
BUN SERPL-MCNC: 21 MG/DL (ref 8–22)
CALCIUM SERPL-MCNC: 7.7 MG/DL (ref 8.5–10.5)
CHLORIDE SERPL-SCNC: 97 MMOL/L (ref 96–112)
CO2 SERPL-SCNC: 27 MMOL/L (ref 20–33)
CREAT SERPL-MCNC: 0.87 MG/DL (ref 0.5–1.4)
EOSINOPHIL # BLD AUTO: 0.21 K/UL (ref 0–0.51)
EOSINOPHIL NFR BLD: 1.7 % (ref 0–6.9)
ERYTHROCYTE [DISTWIDTH] IN BLOOD BY AUTOMATED COUNT: 57.3 FL (ref 35.9–50)
GFR SERPLBLD CREATININE-BSD FMLA CKD-EPI: 95 ML/MIN/1.73 M 2
GLOBULIN SER CALC-MCNC: 3 G/DL (ref 1.9–3.5)
GLUCOSE SERPL-MCNC: 126 MG/DL (ref 65–99)
HCT VFR BLD AUTO: 23 % (ref 42–52)
HGB BLD-MCNC: 7.6 G/DL (ref 14–18)
HYPOCHROMIA BLD QL SMEAR: ABNORMAL
IRON SATN MFR SERPL: 24 % (ref 15–55)
IRON SERPL-MCNC: 38 UG/DL (ref 50–180)
LYMPHOCYTES # BLD AUTO: 1.21 K/UL (ref 1–4.8)
LYMPHOCYTES NFR BLD: 9.6 % (ref 22–41)
MACROCYTES BLD QL SMEAR: ABNORMAL
MANUAL DIFF BLD: NORMAL
MCH RBC QN AUTO: 31.5 PG (ref 27–33)
MCHC RBC AUTO-ENTMCNC: 33 G/DL (ref 33.7–35.3)
MCV RBC AUTO: 95.4 FL (ref 81.4–97.8)
METAMYELOCYTES NFR BLD MANUAL: 1.7 %
MONOCYTES # BLD AUTO: 0.44 K/UL (ref 0–0.85)
MONOCYTES NFR BLD AUTO: 3.5 % (ref 0–13.4)
MORPHOLOGY BLD-IMP: NORMAL
MYELOCYTES NFR BLD MANUAL: 4.4 %
NEUTROPHILS # BLD AUTO: 9.97 K/UL (ref 1.82–7.42)
NEUTROPHILS NFR BLD: 79.1 % (ref 44–72)
NRBC # BLD AUTO: 0.04 K/UL
NRBC BLD-RTO: 0.3 /100 WBC
NT-PROBNP SERPL IA-MCNC: 255 PG/ML (ref 0–125)
PLATELET # BLD AUTO: 174 K/UL (ref 164–446)
PLATELET BLD QL SMEAR: NORMAL
PMV BLD AUTO: 10.4 FL (ref 9–12.9)
POLYCHROMASIA BLD QL SMEAR: NORMAL
POTASSIUM SERPL-SCNC: 4.5 MMOL/L (ref 3.6–5.5)
PROT SERPL-MCNC: 5.6 G/DL (ref 6–8.2)
RBC # BLD AUTO: 2.41 M/UL (ref 4.7–6.1)
RBC BLD AUTO: PRESENT
SODIUM SERPL-SCNC: 132 MMOL/L (ref 135–145)
TIBC SERPL-MCNC: 156 UG/DL (ref 250–450)
UIBC SERPL-MCNC: 118 UG/DL (ref 110–370)
WBC # BLD AUTO: 12.6 K/UL (ref 4.8–10.8)

## 2022-08-24 PROCEDURE — 700111 HCHG RX REV CODE 636 W/ 250 OVERRIDE (IP): Performed by: SURGERY

## 2022-08-24 PROCEDURE — A9270 NON-COVERED ITEM OR SERVICE: HCPCS | Performed by: NURSE PRACTITIONER

## 2022-08-24 PROCEDURE — 83880 ASSAY OF NATRIURETIC PEPTIDE: CPT

## 2022-08-24 PROCEDURE — 85007 BL SMEAR W/DIFF WBC COUNT: CPT

## 2022-08-24 PROCEDURE — 700102 HCHG RX REV CODE 250 W/ 637 OVERRIDE(OP): Performed by: SURGERY

## 2022-08-24 PROCEDURE — 700111 HCHG RX REV CODE 636 W/ 250 OVERRIDE (IP): Performed by: NURSE PRACTITIONER

## 2022-08-24 PROCEDURE — 71275 CT ANGIOGRAPHY CHEST: CPT | Mod: MG

## 2022-08-24 PROCEDURE — A9270 NON-COVERED ITEM OR SERVICE: HCPCS | Performed by: SURGERY

## 2022-08-24 PROCEDURE — 94640 AIRWAY INHALATION TREATMENT: CPT

## 2022-08-24 PROCEDURE — 700117 HCHG RX CONTRAST REV CODE 255: Performed by: NURSE PRACTITIONER

## 2022-08-24 PROCEDURE — 700102 HCHG RX REV CODE 250 W/ 637 OVERRIDE(OP): Performed by: NURSE PRACTITIONER

## 2022-08-24 PROCEDURE — 71045 X-RAY EXAM CHEST 1 VIEW: CPT

## 2022-08-24 PROCEDURE — 83540 ASSAY OF IRON: CPT

## 2022-08-24 PROCEDURE — 94669 MECHANICAL CHEST WALL OSCILL: CPT

## 2022-08-24 PROCEDURE — 85025 COMPLETE CBC W/AUTO DIFF WBC: CPT

## 2022-08-24 PROCEDURE — A9270 NON-COVERED ITEM OR SERVICE: HCPCS | Performed by: SPECIALIST

## 2022-08-24 PROCEDURE — 770022 HCHG ROOM/CARE - ICU (200)

## 2022-08-24 PROCEDURE — 80053 COMPREHEN METABOLIC PANEL: CPT

## 2022-08-24 PROCEDURE — 99233 SBSQ HOSP IP/OBS HIGH 50: CPT | Performed by: SURGERY

## 2022-08-24 PROCEDURE — 83550 IRON BINDING TEST: CPT

## 2022-08-24 PROCEDURE — 700102 HCHG RX REV CODE 250 W/ 637 OVERRIDE(OP): Performed by: SPECIALIST

## 2022-08-24 RX ADMIN — ACETAMINOPHEN 650 MG: 325 TABLET ORAL at 17:46

## 2022-08-24 RX ADMIN — ACETAMINOPHEN 650 MG: 325 TABLET ORAL at 11:57

## 2022-08-24 RX ADMIN — OXYCODONE HYDROCHLORIDE 10 MG: 10 TABLET ORAL at 20:40

## 2022-08-24 RX ADMIN — ACETAMINOPHEN 650 MG: 325 TABLET ORAL at 05:43

## 2022-08-24 RX ADMIN — DOCUSATE SODIUM 100 MG: 100 CAPSULE, LIQUID FILLED ORAL at 05:44

## 2022-08-24 RX ADMIN — IOHEXOL 65 ML: 350 INJECTION, SOLUTION INTRAVENOUS at 11:34

## 2022-08-24 RX ADMIN — GABAPENTIN 100 MG: 100 CAPSULE ORAL at 17:46

## 2022-08-24 RX ADMIN — OXYCODONE 5 MG: 5 TABLET ORAL at 15:14

## 2022-08-24 RX ADMIN — MORPHINE SULFATE 2 MG: 4 INJECTION INTRAVENOUS at 11:22

## 2022-08-24 RX ADMIN — POTASSIUM CHLORIDE 40 MEQ: 20 TABLET, EXTENDED RELEASE ORAL at 18:00

## 2022-08-24 RX ADMIN — METAXALONE 400 MG: 800 TABLET ORAL at 11:58

## 2022-08-24 RX ADMIN — GABAPENTIN 100 MG: 100 CAPSULE ORAL at 05:44

## 2022-08-24 RX ADMIN — OXYCODONE HYDROCHLORIDE 10 MG: 10 TABLET ORAL at 02:12

## 2022-08-24 RX ADMIN — POLYETHYLENE GLYCOL 3350 1 PACKET: 17 POWDER, FOR SOLUTION ORAL at 05:48

## 2022-08-24 RX ADMIN — GABAPENTIN 100 MG: 100 CAPSULE ORAL at 11:58

## 2022-08-24 RX ADMIN — POTASSIUM CHLORIDE 40 MEQ: 20 TABLET, EXTENDED RELEASE ORAL at 05:43

## 2022-08-24 RX ADMIN — MAGNESIUM HYDROXIDE 30 ML: 400 SUSPENSION ORAL at 05:46

## 2022-08-24 RX ADMIN — TAMSULOSIN HYDROCHLORIDE 0.4 MG: 0.4 CAPSULE ORAL at 09:42

## 2022-08-24 RX ADMIN — METAXALONE 400 MG: 800 TABLET ORAL at 05:43

## 2022-08-24 RX ADMIN — ENOXAPARIN SODIUM 30 MG: 30 INJECTION SUBCUTANEOUS at 05:44

## 2022-08-24 RX ADMIN — METAXALONE 400 MG: 800 TABLET ORAL at 17:46

## 2022-08-24 RX ADMIN — ENOXAPARIN SODIUM 30 MG: 30 INJECTION SUBCUTANEOUS at 17:46

## 2022-08-24 RX ADMIN — OXYCODONE HYDROCHLORIDE 10 MG: 10 TABLET ORAL at 05:45

## 2022-08-24 RX ADMIN — MORPHINE SULFATE 4 MG: 4 INJECTION INTRAVENOUS at 21:30

## 2022-08-24 RX ADMIN — OXYCODONE HYDROCHLORIDE 10 MG: 10 TABLET ORAL at 09:46

## 2022-08-24 RX ADMIN — HYDRALAZINE HYDROCHLORIDE 10 MG: 20 INJECTION INTRAMUSCULAR; INTRAVENOUS at 22:12

## 2022-08-24 ASSESSMENT — PATIENT HEALTH QUESTIONNAIRE - PHQ9
1. LITTLE INTEREST OR PLEASURE IN DOING THINGS: NOT AT ALL
SUM OF ALL RESPONSES TO PHQ9 QUESTIONS 1 AND 2: 0
2. FEELING DOWN, DEPRESSED, IRRITABLE, OR HOPELESS: NOT AT ALL

## 2022-08-24 ASSESSMENT — PAIN DESCRIPTION - PAIN TYPE
TYPE: ACUTE PAIN

## 2022-08-24 ASSESSMENT — PAIN SCALES - WONG BAKER
WONGBAKER_NUMERICALRESPONSE: HURTS A WHOLE LOT
WONGBAKER_NUMERICALRESPONSE: HURTS EVEN MORE
WONGBAKER_NUMERICALRESPONSE: HURTS JUST A LITTLE BIT

## 2022-08-24 ASSESSMENT — FIBROSIS 4 INDEX: FIB4 SCORE: 3.17

## 2022-08-24 NOTE — PROGRESS NOTES
1108: Patient off unit to CT with ACLS RN and tech on 15L NRB and tele monitor.     1137: Return to S126, tolerated trip well
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
## 2022-08-24 NOTE — CARE PLAN
Problem: Bowel Elimination - Post Surgical  Goal: Patient will resume regular bowel sounds and function with no discomfort or distention  Outcome: Not Progressing   The patient is Watcher - Medium risk of patient condition declining or worsening    Shift Goals  Clinical Goals: pulmonary toileting, mobility  Patient Goals: restful night, sit up on bed  Family Goals: patient comfort    Progress made toward(s) clinical / shift goals:    Problem: Knowledge Deficit - Standard  Goal: Patient and family/care givers will demonstrate understanding of plan of care, disease process/condition, diagnostic tests and medications  Outcome: Progressing     Problem: Skin Integrity  Goal: Skin integrity is maintained or improved  Outcome: Progressing     Problem: Fall Risk  Goal: Patient will remain free from falls  Outcome: Progressing     Problem: Pain - Standard  Goal: Alleviation of pain or a reduction in pain to the patient’s comfort goal  Outcome: Progressing     Problem: Neurovascular Monitoring  Goal: Patient's neurovascular status will be maintained or improve  Outcome: Progressing     Problem: Early Mobilization - Post Surgery  Goal: Early mobilization post surgery  Outcome: Progressing       Patient is not progressing towards the following goals:      Problem: Bowel Elimination - Post Surgical  Goal: Patient will resume regular bowel sounds and function with no discomfort or distention  Outcome: Not Progressing

## 2022-08-24 NOTE — DISCHARGE PLANNING
Patient discussed in IDT rounds with Dr. Merritt. No case management or dc needs mentioned at this time. Therapy's rec post-acute placement. Pt would benefit from a PMR consult. Anticipate IPR vs SNF.

## 2022-08-25 ENCOUNTER — APPOINTMENT (OUTPATIENT)
Dept: RADIOLOGY | Facility: MEDICAL CENTER | Age: 67
DRG: 958 | End: 2022-08-25
Attending: SURGERY
Payer: MEDICARE

## 2022-08-25 PROBLEM — D72.829 LEUKOCYTOSIS: Status: ACTIVE | Noted: 2022-08-25

## 2022-08-25 LAB
ALBUMIN SERPL BCP-MCNC: 2.9 G/DL (ref 3.2–4.9)
ALBUMIN/GLOB SERPL: 1 G/DL
ALP SERPL-CCNC: 86 U/L (ref 30–99)
ALT SERPL-CCNC: 36 U/L (ref 2–50)
ANION GAP SERPL CALC-SCNC: 7 MMOL/L (ref 7–16)
ANISOCYTOSIS BLD QL SMEAR: ABNORMAL
AST SERPL-CCNC: 47 U/L (ref 12–45)
BASOPHILS # BLD AUTO: 0 % (ref 0–1.8)
BASOPHILS # BLD: 0 K/UL (ref 0–0.12)
BILIRUB SERPL-MCNC: 1.3 MG/DL (ref 0.1–1.5)
BUN SERPL-MCNC: 18 MG/DL (ref 8–22)
CALCIUM SERPL-MCNC: 7.8 MG/DL (ref 8.5–10.5)
CHLORIDE SERPL-SCNC: 99 MMOL/L (ref 96–112)
CO2 SERPL-SCNC: 26 MMOL/L (ref 20–33)
CREAT SERPL-MCNC: 0.67 MG/DL (ref 0.5–1.4)
EOSINOPHIL # BLD AUTO: 0.15 K/UL (ref 0–0.51)
EOSINOPHIL NFR BLD: 0.9 % (ref 0–6.9)
ERYTHROCYTE [DISTWIDTH] IN BLOOD BY AUTOMATED COUNT: 55.4 FL (ref 35.9–50)
GFR SERPLBLD CREATININE-BSD FMLA CKD-EPI: 103 ML/MIN/1.73 M 2
GLOBULIN SER CALC-MCNC: 2.8 G/DL (ref 1.9–3.5)
GLUCOSE SERPL-MCNC: 121 MG/DL (ref 65–99)
HCT VFR BLD AUTO: 24 % (ref 42–52)
HGB BLD-MCNC: 8 G/DL (ref 14–18)
LYMPHOCYTES # BLD AUTO: 1.32 K/UL (ref 1–4.8)
LYMPHOCYTES NFR BLD: 7.8 % (ref 22–41)
MACROCYTES BLD QL SMEAR: ABNORMAL
MANUAL DIFF BLD: NORMAL
MCH RBC QN AUTO: 31.5 PG (ref 27–33)
MCHC RBC AUTO-ENTMCNC: 33.3 G/DL (ref 33.7–35.3)
MCV RBC AUTO: 94.5 FL (ref 81.4–97.8)
MONOCYTES # BLD AUTO: 0.44 K/UL (ref 0–0.85)
MONOCYTES NFR BLD AUTO: 2.6 % (ref 0–13.4)
MORPHOLOGY BLD-IMP: NORMAL
NEUTROPHILS # BLD AUTO: 14.99 K/UL (ref 1.82–7.42)
NEUTROPHILS NFR BLD: 87 % (ref 44–72)
NEUTS BAND NFR BLD MANUAL: 1.7 % (ref 0–10)
NRBC # BLD AUTO: 0.03 K/UL
NRBC BLD-RTO: 0.2 /100 WBC
PLATELET # BLD AUTO: 216 K/UL (ref 164–446)
PLATELET BLD QL SMEAR: NORMAL
PMV BLD AUTO: 9.4 FL (ref 9–12.9)
POLYCHROMASIA BLD QL SMEAR: NORMAL
POTASSIUM SERPL-SCNC: 4.9 MMOL/L (ref 3.6–5.5)
PROT SERPL-MCNC: 5.7 G/DL (ref 6–8.2)
RBC # BLD AUTO: 2.54 M/UL (ref 4.7–6.1)
RBC BLD AUTO: PRESENT
SODIUM SERPL-SCNC: 132 MMOL/L (ref 135–145)
WBC # BLD AUTO: 16.9 K/UL (ref 4.8–10.8)

## 2022-08-25 PROCEDURE — 700102 HCHG RX REV CODE 250 W/ 637 OVERRIDE(OP): Performed by: SPECIALIST

## 2022-08-25 PROCEDURE — 700101 HCHG RX REV CODE 250: Performed by: SPECIALIST

## 2022-08-25 PROCEDURE — 700111 HCHG RX REV CODE 636 W/ 250 OVERRIDE (IP): Performed by: SURGERY

## 2022-08-25 PROCEDURE — 94669 MECHANICAL CHEST WALL OSCILL: CPT

## 2022-08-25 PROCEDURE — 71045 X-RAY EXAM CHEST 1 VIEW: CPT

## 2022-08-25 PROCEDURE — 99233 SBSQ HOSP IP/OBS HIGH 50: CPT | Performed by: SURGERY

## 2022-08-25 PROCEDURE — A9270 NON-COVERED ITEM OR SERVICE: HCPCS | Performed by: NURSE PRACTITIONER

## 2022-08-25 PROCEDURE — A9270 NON-COVERED ITEM OR SERVICE: HCPCS | Performed by: SURGERY

## 2022-08-25 PROCEDURE — 85025 COMPLETE CBC W/AUTO DIFF WBC: CPT

## 2022-08-25 PROCEDURE — 80053 COMPREHEN METABOLIC PANEL: CPT

## 2022-08-25 PROCEDURE — 85007 BL SMEAR W/DIFF WBC COUNT: CPT

## 2022-08-25 PROCEDURE — 770022 HCHG ROOM/CARE - ICU (200)

## 2022-08-25 PROCEDURE — A9270 NON-COVERED ITEM OR SERVICE: HCPCS | Performed by: SPECIALIST

## 2022-08-25 PROCEDURE — 700102 HCHG RX REV CODE 250 W/ 637 OVERRIDE(OP): Performed by: SURGERY

## 2022-08-25 PROCEDURE — 700102 HCHG RX REV CODE 250 W/ 637 OVERRIDE(OP): Performed by: NURSE PRACTITIONER

## 2022-08-25 RX ORDER — ASPIRIN 81 MG/1
81 TABLET, CHEWABLE ORAL DAILY
Status: DISCONTINUED | OUTPATIENT
Start: 2022-08-25 | End: 2022-08-30 | Stop reason: HOSPADM

## 2022-08-25 RX ADMIN — ENOXAPARIN SODIUM 30 MG: 30 INJECTION SUBCUTANEOUS at 17:49

## 2022-08-25 RX ADMIN — GABAPENTIN 100 MG: 100 CAPSULE ORAL at 11:59

## 2022-08-25 RX ADMIN — LIDOCAINE 1 PATCH: 50 PATCH TOPICAL at 00:08

## 2022-08-25 RX ADMIN — DOCUSATE SODIUM 50 MG AND SENNOSIDES 8.6 MG 1 TABLET: 8.6; 5 TABLET, FILM COATED ORAL at 21:00

## 2022-08-25 RX ADMIN — POTASSIUM CHLORIDE 40 MEQ: 20 TABLET, EXTENDED RELEASE ORAL at 17:49

## 2022-08-25 RX ADMIN — ONDANSETRON 4 MG: 2 INJECTION INTRAMUSCULAR; INTRAVENOUS at 17:19

## 2022-08-25 RX ADMIN — GABAPENTIN 100 MG: 100 CAPSULE ORAL at 17:49

## 2022-08-25 RX ADMIN — TAMSULOSIN HYDROCHLORIDE 0.4 MG: 0.4 CAPSULE ORAL at 07:40

## 2022-08-25 RX ADMIN — GABAPENTIN 100 MG: 100 CAPSULE ORAL at 05:59

## 2022-08-25 RX ADMIN — ACETAMINOPHEN 650 MG: 325 TABLET ORAL at 17:49

## 2022-08-25 RX ADMIN — OXYCODONE HYDROCHLORIDE 10 MG: 10 TABLET ORAL at 07:40

## 2022-08-25 RX ADMIN — METAXALONE 400 MG: 800 TABLET ORAL at 05:59

## 2022-08-25 RX ADMIN — DOCUSATE SODIUM 50 MG AND SENNOSIDES 8.6 MG 1 TABLET: 8.6; 5 TABLET, FILM COATED ORAL at 20:11

## 2022-08-25 RX ADMIN — OXYCODONE HYDROCHLORIDE 10 MG: 10 TABLET ORAL at 15:55

## 2022-08-25 RX ADMIN — POTASSIUM CHLORIDE 40 MEQ: 20 TABLET, EXTENDED RELEASE ORAL at 05:59

## 2022-08-25 RX ADMIN — ENOXAPARIN SODIUM 30 MG: 30 INJECTION SUBCUTANEOUS at 06:01

## 2022-08-25 RX ADMIN — ACETAMINOPHEN 650 MG: 325 TABLET ORAL at 00:09

## 2022-08-25 RX ADMIN — DOCUSATE SODIUM 100 MG: 100 CAPSULE, LIQUID FILLED ORAL at 17:49

## 2022-08-25 RX ADMIN — MORPHINE SULFATE 4 MG: 4 INJECTION INTRAVENOUS at 03:27

## 2022-08-25 RX ADMIN — ACETAMINOPHEN 650 MG: 325 TABLET ORAL at 11:59

## 2022-08-25 RX ADMIN — OXYCODONE HYDROCHLORIDE 10 MG: 10 TABLET ORAL at 11:59

## 2022-08-25 RX ADMIN — ACETAMINOPHEN 650 MG: 325 TABLET ORAL at 05:59

## 2022-08-25 RX ADMIN — OXYCODONE HYDROCHLORIDE 10 MG: 10 TABLET ORAL at 20:11

## 2022-08-25 RX ADMIN — MORPHINE SULFATE 4 MG: 4 INJECTION INTRAVENOUS at 09:43

## 2022-08-25 RX ADMIN — METAXALONE 400 MG: 800 TABLET ORAL at 17:49

## 2022-08-25 RX ADMIN — ASPIRIN 81 MG: 81 TABLET, CHEWABLE ORAL at 12:45

## 2022-08-25 RX ADMIN — METAXALONE 400 MG: 800 TABLET ORAL at 11:59

## 2022-08-25 RX ADMIN — OXYCODONE HYDROCHLORIDE 10 MG: 10 TABLET ORAL at 00:09

## 2022-08-25 ASSESSMENT — PAIN DESCRIPTION - PAIN TYPE
TYPE: ACUTE PAIN

## 2022-08-25 ASSESSMENT — PAIN SCALES - WONG BAKER
WONGBAKER_NUMERICALRESPONSE: HURTS JUST A LITTLE BIT
WONGBAKER_NUMERICALRESPONSE: HURTS JUST A LITTLE BIT

## 2022-08-25 ASSESSMENT — FIBROSIS 4 INDEX: FIB4 SCORE: 2.36
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
## 2022-08-25 NOTE — PROGRESS NOTES
"   Orthopaedic Progress Note    Interval changes:  Patient doing well     Dressings CDI    ROS - Patient denies any new issues.  Pain well controlled.    BP (!) 170/76   Pulse 89   Temp 37.1 °C (98.8 °F) (Temporal)   Resp (!) 25   Ht 1.753 m (5' 9.02\")   Wt 95 kg (209 lb 7 oz)   SpO2 94%       Patient seen and examined  No acute distress  Breathing non labored  RRR  LLE proximal I&D dressings CDI. Distal LLE dressings CDI, DVNI, moves all toes, cap refill <2 sec.     Recent Labs     08/23/22  0521 08/24/22  0444 08/25/22  0506   WBC 14.2* 12.6* 16.9*   RBC 2.48* 2.41* 2.54*   HEMOGLOBIN 7.8* 7.6* 8.0*   HEMATOCRIT 23.1* 23.0* 24.0*   MCV 93.1 95.4 94.5   MCH 31.5 31.5 31.5   MCHC 33.8 33.0* 33.3*   RDW 56.5* 57.3* 55.4*   PLATELETCT 144* 174 216   MPV 10.4 10.4 9.4       Active Hospital Problems    Diagnosis     Fracture of ribs, seven, left, closed, initial encounter [S22.42XA]      Priority: High    Traumatic tear of thoracic aorta [S25.01XA]      Priority: High    Bandemia [D72.825]      Priority: Medium    Blood loss anemia [D50.0]      Priority: Medium    Type III open fracture of left tibia and fibula [S82.202C, S82.402C]      Priority: Medium    Penetrating injury of left lower extremity [S81.832A]      Priority: Medium    Closed fracture of transverse process of lumbar vertebra (HCC) [S32.009A]      Priority: Medium    Closed fracture of posterior wall of left acetabulum (HCC) [S32.422A]      Priority: Medium    Free fluid in pelvis [R18.8]      Priority: Medium    Humeral head fracture [S42.293A]      Priority: Medium    Trauma [T14.90XA]      Priority: Low    No contraindication to deep vein thrombosis (DVT) prophylaxis [Z78.9]      Priority: Low    Left arm swelling [M79.89]      Priority: Low    Benign prostatic hyperplasia without lower urinary tract symptoms [N40.0]      Priority: Low    Leukocytosis [D72.829]        Assessment/Plan:  Patient doing well   POD#6 S/P:  1. Open treatment of left " tibia shaft fracture with insertion of intramedullary implant  2.  Open external fixation left distal tibia intra-articular fracture with fixation of fibula  3.  Removal external fixator under anesthesia  POD#8  S/P   1.  Irrigation debridement open fracture   2.  External fixation left leg   3.  Exploration left thigh penetrating wound  Wt bearing status - NWB LLE  Wound care/Drains - Dressings to be left in place  Future Procedures - non planned   Sutures/Staples out- 14 days post operatively  PT/OT-initiated  Antibiotics: completed  DVT Prophylaxis- TEDS/SCDs/Foot pumps  Levy-none  Case Coordination for Discharge Planning - Disposition pending final therapy recs

## 2022-08-25 NOTE — CARE PLAN
Problem: Knowledge Deficit - Standard  Goal: Patient and family/care givers will demonstrate understanding of plan of care, disease process/condition, diagnostic tests and medications  Outcome: Progressing     Problem: Skin Integrity  Goal: Skin integrity is maintained or improved  Outcome: Progressing     Problem: Fall Risk  Goal: Patient will remain free from falls  Outcome: Progressing     Problem: Pain - Standard  Goal: Alleviation of pain or a reduction in pain to the patient’s comfort goal  Outcome: Progressing     Problem: Neurovascular Monitoring  Goal: Patient's neurovascular status will be maintained or improve  Outcome: Progressing     Problem: Early Mobilization - Post Surgery  Goal: Early mobilization post surgery  Outcome: Progressing     Problem: Bowel Elimination - Post Surgical  Goal: Patient will resume regular bowel sounds and function with no discomfort or distention  Outcome: Progressing   The patient is Watcher - Medium risk of patient condition declining or worsening    Shift Goals  Clinical Goals: wean O2, pulmonary hygiene, pain control  Patient Goals: gurmeet control, sleep  Family Goals: no family present

## 2022-08-25 NOTE — CARE PLAN
The patient is Stable - Low risk of patient condition declining or worsening    Shift Goals  Clinical Goals: pulmonary hygiene, pain control  Patient Goals: pain control  Family Goals: no family present    Progress made toward(s) clinical / shift goals:  pulmonary hygiene, pain control     Patient is not progressing towards the following goals: n/a      Problem: Pain - Standard  Goal: Alleviation of pain or a reduction in pain to the patient’s comfort goal  Outcome: Progressing     Problem: Fall Risk  Goal: Patient will remain free from falls  Outcome: Progressing     Problem: Neurovascular Monitoring  Goal: Patient's neurovascular status will be maintained or improve  Outcome: Met

## 2022-08-25 NOTE — PROGRESS NOTES
"   Orthopaedic Progress Note    Interval changes:  Patient doing well     Dressings CDI    ROS - Patient denies any new issues.  Pain well controlled.    BP (!) 155/67   Pulse 88   Temp 37.3 °C (99.1 °F) (Temporal)   Resp (!) 24   Ht 1.753 m (5' 9.02\")   Wt 95.5 kg (210 lb 8.6 oz)   SpO2 91%       Patient seen and examined  No acute distress  Breathing non labored  RRR  LLE proximal I&D dressings CDI. Distal LLE dressings CDI, DVNI, moves all toes, cap refill <2 sec.     Recent Labs     08/22/22  0453 08/22/22  1747 08/23/22  0521 08/24/22  0444   WBC 12.1*  --  14.2* 12.6*   RBC 2.09*  --  2.48* 2.41*   HEMOGLOBIN 6.6* 8.1* 7.8* 7.6*   HEMATOCRIT 19.5* 24.1* 23.1* 23.0*   MCV 93.3  --  93.1 95.4   MCH 31.6  --  31.5 31.5   MCHC 33.8  --  33.8 33.0*   RDW 54.8*  --  56.5* 57.3*   PLATELETCT 127*  --  144* 174   MPV 10.5  --  10.4 10.4       Active Hospital Problems    Diagnosis     Fracture of ribs, seven, left, closed, initial encounter [S22.42XA]      Priority: High    Traumatic tear of thoracic aorta [S25.01XA]      Priority: High    Bandemia [D72.825]      Priority: Medium    Blood loss anemia [D50.0]      Priority: Medium    Type III open fracture of left tibia and fibula [S82.202C, S82.402C]      Priority: Medium    Penetrating injury of left lower extremity [S81.832A]      Priority: Medium    Closed fracture of transverse process of lumbar vertebra (HCC) [S32.009A]      Priority: Medium    Closed fracture of posterior wall of left acetabulum (HCC) [S32.422A]      Priority: Medium    Free fluid in pelvis [R18.8]      Priority: Medium    Humeral head fracture [S42.293A]      Priority: Medium    Trauma [T14.90XA]      Priority: Low    No contraindication to deep vein thrombosis (DVT) prophylaxis [Z78.9]      Priority: Low    Left arm swelling [M79.89]      Priority: Low    Benign prostatic hyperplasia without lower urinary tract symptoms [N40.0]      Priority: Low       Assessment/Plan:  Patient doing well "   POD#5 S/P:  1. Open treatment of left tibia shaft fracture with insertion of intramedullary implant  2.  Open external fixation left distal tibia intra-articular fracture with fixation of fibula  3.  Removal external fixator under anesthesia  POD#7  S/P   1.  Irrigation debridement open fracture   2.  External fixation left leg   3.  Exploration left thigh penetrating wound  Wt bearing status - NWB LLE  Wound care/Drains - Dressings to be left in place  Future Procedures - non planned   Sutures/Staples out- 14 days post operatively  PT/OT-initiated  Antibiotics: completed  DVT Prophylaxis- TEDS/SCDs/Foot pumps  Levy-none  Case Coordination for Discharge Planning - Disposition pending final therapy recs

## 2022-08-26 ENCOUNTER — APPOINTMENT (OUTPATIENT)
Dept: RADIOLOGY | Facility: MEDICAL CENTER | Age: 67
DRG: 958 | End: 2022-08-26
Attending: SURGERY
Payer: MEDICARE

## 2022-08-26 LAB
ALBUMIN SERPL BCP-MCNC: 2.5 G/DL (ref 3.2–4.9)
ALBUMIN/GLOB SERPL: 0.9 G/DL
ALP SERPL-CCNC: 94 U/L (ref 30–99)
ALT SERPL-CCNC: 41 U/L (ref 2–50)
ANION GAP SERPL CALC-SCNC: 5 MMOL/L (ref 7–16)
AST SERPL-CCNC: 51 U/L (ref 12–45)
BACTERIA BLD CULT: NORMAL
BACTERIA BLD CULT: NORMAL
BASOPHILS # BLD AUTO: 0.3 % (ref 0–1.8)
BASOPHILS # BLD: 0.05 K/UL (ref 0–0.12)
BILIRUB SERPL-MCNC: 1.2 MG/DL (ref 0.1–1.5)
BUN SERPL-MCNC: 17 MG/DL (ref 8–22)
CALCIUM SERPL-MCNC: 7.6 MG/DL (ref 8.5–10.5)
CHLORIDE SERPL-SCNC: 97 MMOL/L (ref 96–112)
CO2 SERPL-SCNC: 27 MMOL/L (ref 20–33)
CREAT SERPL-MCNC: 0.75 MG/DL (ref 0.5–1.4)
EKG IMPRESSION: NORMAL
EOSINOPHIL # BLD AUTO: 0.12 K/UL (ref 0–0.51)
EOSINOPHIL NFR BLD: 0.7 % (ref 0–6.9)
ERYTHROCYTE [DISTWIDTH] IN BLOOD BY AUTOMATED COUNT: 58 FL (ref 35.9–50)
GFR SERPLBLD CREATININE-BSD FMLA CKD-EPI: 100 ML/MIN/1.73 M 2
GLOBULIN SER CALC-MCNC: 2.8 G/DL (ref 1.9–3.5)
GLUCOSE SERPL-MCNC: 122 MG/DL (ref 65–99)
GRAM STN SPEC: NORMAL
HCT VFR BLD AUTO: 23 % (ref 42–52)
HGB BLD-MCNC: 7.6 G/DL (ref 14–18)
IMM GRANULOCYTES # BLD AUTO: 0.85 K/UL (ref 0–0.11)
IMM GRANULOCYTES NFR BLD AUTO: 4.8 % (ref 0–0.9)
LYMPHOCYTES # BLD AUTO: 1.13 K/UL (ref 1–4.8)
LYMPHOCYTES NFR BLD: 6.4 % (ref 22–41)
MCH RBC QN AUTO: 31.5 PG (ref 27–33)
MCHC RBC AUTO-ENTMCNC: 33 G/DL (ref 33.7–35.3)
MCV RBC AUTO: 95.4 FL (ref 81.4–97.8)
MONOCYTES # BLD AUTO: 0.96 K/UL (ref 0–0.85)
MONOCYTES NFR BLD AUTO: 5.5 % (ref 0–13.4)
NEUTROPHILS # BLD AUTO: 14.43 K/UL (ref 1.82–7.42)
NEUTROPHILS NFR BLD: 82.3 % (ref 44–72)
NRBC # BLD AUTO: 0 K/UL
NRBC BLD-RTO: 0 /100 WBC
PLATELET # BLD AUTO: 256 K/UL (ref 164–446)
PMV BLD AUTO: 9.8 FL (ref 9–12.9)
POTASSIUM SERPL-SCNC: 4.7 MMOL/L (ref 3.6–5.5)
PROT SERPL-MCNC: 5.3 G/DL (ref 6–8.2)
RBC # BLD AUTO: 2.41 M/UL (ref 4.7–6.1)
SCCMEC + MECA PNL NOSE NAA+PROBE: NEGATIVE
SIGNIFICANT IND 70042: NORMAL
SITE SITE: NORMAL
SODIUM SERPL-SCNC: 129 MMOL/L (ref 135–145)
SOURCE SOURCE: NORMAL
WBC # BLD AUTO: 17.5 K/UL (ref 4.8–10.8)

## 2022-08-26 PROCEDURE — 80053 COMPREHEN METABOLIC PANEL: CPT

## 2022-08-26 PROCEDURE — 87077 CULTURE AEROBIC IDENTIFY: CPT

## 2022-08-26 PROCEDURE — 99024 POSTOP FOLLOW-UP VISIT: CPT | Performed by: ORTHOPAEDIC SURGERY

## 2022-08-26 PROCEDURE — 700102 HCHG RX REV CODE 250 W/ 637 OVERRIDE(OP): Performed by: SURGERY

## 2022-08-26 PROCEDURE — 71045 X-RAY EXAM CHEST 1 VIEW: CPT

## 2022-08-26 PROCEDURE — 87205 SMEAR GRAM STAIN: CPT

## 2022-08-26 PROCEDURE — 93010 ELECTROCARDIOGRAM REPORT: CPT | Performed by: INTERNAL MEDICINE

## 2022-08-26 PROCEDURE — 770022 HCHG ROOM/CARE - ICU (200)

## 2022-08-26 PROCEDURE — 700111 HCHG RX REV CODE 636 W/ 250 OVERRIDE (IP): Performed by: SURGERY

## 2022-08-26 PROCEDURE — 700101 HCHG RX REV CODE 250: Performed by: SPECIALIST

## 2022-08-26 PROCEDURE — 93005 ELECTROCARDIOGRAM TRACING: CPT | Performed by: SURGERY

## 2022-08-26 PROCEDURE — 700102 HCHG RX REV CODE 250 W/ 637 OVERRIDE(OP): Performed by: SPECIALIST

## 2022-08-26 PROCEDURE — 99233 SBSQ HOSP IP/OBS HIGH 50: CPT | Performed by: SURGERY

## 2022-08-26 PROCEDURE — 87040 BLOOD CULTURE FOR BACTERIA: CPT | Mod: 91

## 2022-08-26 PROCEDURE — A9270 NON-COVERED ITEM OR SERVICE: HCPCS | Performed by: SURGERY

## 2022-08-26 PROCEDURE — 87641 MR-STAPH DNA AMP PROBE: CPT

## 2022-08-26 PROCEDURE — 76604 US EXAM CHEST: CPT

## 2022-08-26 PROCEDURE — 94669 MECHANICAL CHEST WALL OSCILL: CPT

## 2022-08-26 PROCEDURE — 700105 HCHG RX REV CODE 258: Performed by: SURGERY

## 2022-08-26 PROCEDURE — A9270 NON-COVERED ITEM OR SERVICE: HCPCS | Performed by: SPECIALIST

## 2022-08-26 PROCEDURE — 94760 N-INVAS EAR/PLS OXIMETRY 1: CPT

## 2022-08-26 PROCEDURE — 87070 CULTURE OTHR SPECIMN AEROBIC: CPT

## 2022-08-26 PROCEDURE — 85025 COMPLETE CBC W/AUTO DIFF WBC: CPT

## 2022-08-26 PROCEDURE — 700102 HCHG RX REV CODE 250 W/ 637 OVERRIDE(OP): Performed by: NURSE PRACTITIONER

## 2022-08-26 PROCEDURE — A9270 NON-COVERED ITEM OR SERVICE: HCPCS | Performed by: NURSE PRACTITIONER

## 2022-08-26 RX ADMIN — TAMSULOSIN HYDROCHLORIDE 0.4 MG: 0.4 CAPSULE ORAL at 08:39

## 2022-08-26 RX ADMIN — VANCOMYCIN HYDROCHLORIDE 2250 MG: 500 INJECTION, POWDER, LYOPHILIZED, FOR SOLUTION INTRAVENOUS at 11:52

## 2022-08-26 RX ADMIN — MORPHINE SULFATE 2 MG: 4 INJECTION INTRAVENOUS at 19:58

## 2022-08-26 RX ADMIN — POTASSIUM CHLORIDE 40 MEQ: 20 TABLET, EXTENDED RELEASE ORAL at 16:58

## 2022-08-26 RX ADMIN — OXYCODONE HYDROCHLORIDE 10 MG: 10 TABLET ORAL at 03:04

## 2022-08-26 RX ADMIN — OXYCODONE HYDROCHLORIDE 10 MG: 10 TABLET ORAL at 21:57

## 2022-08-26 RX ADMIN — ACETAMINOPHEN 650 MG: 325 TABLET ORAL at 11:59

## 2022-08-26 RX ADMIN — ACETAMINOPHEN 650 MG: 325 TABLET ORAL at 23:10

## 2022-08-26 RX ADMIN — METAXALONE 400 MG: 800 TABLET ORAL at 06:08

## 2022-08-26 RX ADMIN — ASPIRIN 81 MG: 81 TABLET, CHEWABLE ORAL at 06:07

## 2022-08-26 RX ADMIN — CEFEPIME 2 G: 2 INJECTION, POWDER, FOR SOLUTION INTRAVENOUS at 14:13

## 2022-08-26 RX ADMIN — METAXALONE 400 MG: 800 TABLET ORAL at 16:59

## 2022-08-26 RX ADMIN — ENOXAPARIN SODIUM 30 MG: 30 INJECTION SUBCUTANEOUS at 06:07

## 2022-08-26 RX ADMIN — OXYCODONE HYDROCHLORIDE 10 MG: 10 TABLET ORAL at 16:57

## 2022-08-26 RX ADMIN — MORPHINE SULFATE 4 MG: 4 INJECTION INTRAVENOUS at 10:23

## 2022-08-26 RX ADMIN — GABAPENTIN 100 MG: 100 CAPSULE ORAL at 06:07

## 2022-08-26 RX ADMIN — ENOXAPARIN SODIUM 30 MG: 30 INJECTION SUBCUTANEOUS at 17:00

## 2022-08-26 RX ADMIN — ACETAMINOPHEN 650 MG: 325 TABLET ORAL at 01:10

## 2022-08-26 RX ADMIN — OXYCODONE HYDROCHLORIDE 10 MG: 10 TABLET ORAL at 06:06

## 2022-08-26 RX ADMIN — MORPHINE SULFATE 2 MG: 4 INJECTION INTRAVENOUS at 14:13

## 2022-08-26 RX ADMIN — DOCUSATE SODIUM 50 MG AND SENNOSIDES 8.6 MG 1 TABLET: 8.6; 5 TABLET, FILM COATED ORAL at 21:58

## 2022-08-26 RX ADMIN — METAXALONE 400 MG: 800 TABLET ORAL at 11:58

## 2022-08-26 RX ADMIN — DOCUSATE SODIUM 100 MG: 100 CAPSULE, LIQUID FILLED ORAL at 16:59

## 2022-08-26 RX ADMIN — ACETAMINOPHEN 650 MG: 325 TABLET ORAL at 17:00

## 2022-08-26 RX ADMIN — POTASSIUM CHLORIDE 40 MEQ: 20 TABLET, EXTENDED RELEASE ORAL at 06:09

## 2022-08-26 RX ADMIN — ACETAMINOPHEN 650 MG: 325 TABLET ORAL at 06:07

## 2022-08-26 RX ADMIN — DOCUSATE SODIUM 100 MG: 100 CAPSULE, LIQUID FILLED ORAL at 06:07

## 2022-08-26 RX ADMIN — CEFEPIME 2 G: 2 INJECTION, POWDER, FOR SOLUTION INTRAVENOUS at 23:10

## 2022-08-26 RX ADMIN — VANCOMYCIN HYDROCHLORIDE 1250 MG: 500 INJECTION, POWDER, LYOPHILIZED, FOR SOLUTION INTRAVENOUS at 23:09

## 2022-08-26 RX ADMIN — LIDOCAINE 1 PATCH: 50 PATCH TOPICAL at 01:09

## 2022-08-26 RX ADMIN — OXYCODONE HYDROCHLORIDE 10 MG: 10 TABLET ORAL at 11:58

## 2022-08-26 RX ADMIN — GABAPENTIN 100 MG: 100 CAPSULE ORAL at 17:00

## 2022-08-26 ASSESSMENT — PAIN DESCRIPTION - PAIN TYPE
TYPE: ACUTE PAIN
TYPE: ACUTE PAIN
TYPE: ACUTE PAIN;SURGICAL PAIN
TYPE: ACUTE PAIN
TYPE: ACUTE PAIN;CHRONIC PAIN
TYPE: ACUTE PAIN
TYPE: ACUTE PAIN
TYPE: ACUTE PAIN;CHRONIC PAIN
TYPE: ACUTE PAIN

## 2022-08-26 ASSESSMENT — PAIN SCALES - PAIN ASSESSMENT IN ADVANCED DEMENTIA (PAINAD)
CONSOLABILITY: NO NEED TO CONSOLE
CONSOLABILITY: NO NEED TO CONSOLE
BODYLANGUAGE: RELAXED
BODYLANGUAGE: RELAXED

## 2022-08-26 ASSESSMENT — LIFESTYLE VARIABLES: DOES PATIENT WANT TO STOP DRINKING: NO

## 2022-08-26 ASSESSMENT — ENCOUNTER SYMPTOMS
SHORTNESS OF BREATH: 0
ABDOMINAL PAIN: 0

## 2022-08-26 ASSESSMENT — FIBROSIS 4 INDEX: FIB4 SCORE: 2.02

## 2022-08-26 NOTE — PROGRESS NOTES
"   Orthopaedic Progress Note    Interval changes:  Patient doing well     Dressings CDI    ROS - Patient denies any new issues.  Pain well controlled.    /67   Pulse 86   Temp 36.7 °C (98.1 °F) (Temporal)   Resp (!) 24   Ht 1.753 m (5' 9.02\")   Wt 85.6 kg (188 lb 11.4 oz)   SpO2 97%       Patient seen and examined  No acute distress  Breathing non labored  RRR  LLE proximal I&D dressings CDI. Distal LLE dressings CDI, DVNI, moves all toes, cap refill <2 sec.     Recent Labs     08/24/22  0444 08/25/22  0506 08/26/22  0419   WBC 12.6* 16.9* 17.5*   RBC 2.41* 2.54* 2.41*   HEMOGLOBIN 7.6* 8.0* 7.6*   HEMATOCRIT 23.0* 24.0* 23.0*   MCV 95.4 94.5 95.4   MCH 31.5 31.5 31.5   MCHC 33.0* 33.3* 33.0*   RDW 57.3* 55.4* 58.0*   PLATELETCT 174 216 256   MPV 10.4 9.4 9.8       Active Hospital Problems    Diagnosis     Fracture of ribs, seven, left, closed, initial encounter [S22.42XA]      Priority: High    Traumatic tear of thoracic aorta [S25.01XA]      Priority: High    Bandemia [D72.825]      Priority: Medium    Blood loss anemia [D50.0]      Priority: Medium    Type III open fracture of left tibia and fibula [S82.202C, S82.402C]      Priority: Medium    Penetrating injury of left lower extremity [S81.832A]      Priority: Medium    Closed fracture of transverse process of lumbar vertebra (HCC) [S32.009A]      Priority: Medium    Closed fracture of posterior wall of left acetabulum (HCC) [S32.422A]      Priority: Medium    Free fluid in pelvis [R18.8]      Priority: Medium    Humeral head fracture [S42.293A]      Priority: Medium    Trauma [T14.90XA]      Priority: Low    No contraindication to deep vein thrombosis (DVT) prophylaxis [Z78.9]      Priority: Low    Left arm swelling [M79.89]      Priority: Low    Benign prostatic hyperplasia without lower urinary tract symptoms [N40.0]      Priority: Low    Leukocytosis [D72.829]        Assessment/Plan:  Patient doing well   POD#7 S/P:  1. Open treatment of left " tibia shaft fracture with insertion of intramedullary implant  2.  Open external fixation left distal tibia intra-articular fracture with fixation of fibula  3.  Removal external fixator under anesthesia  POD#9  S/P   1.  Irrigation debridement open fracture   2.  External fixation left leg   3.  Exploration left thigh penetrating wound  Wt bearing status - NWB LLE  Wound care/Drains - Dressings to be left in place  Future Procedures - non planned   Sutures/Staples out- 14 days post operatively  PT/OT-initiated  Antibiotics: completed  DVT Prophylaxis- TEDS/SCDs/Foot pumps  Levy-none  Case Coordination for Discharge Planning - Disposition pending final therapy recs

## 2022-08-26 NOTE — CARE PLAN
The patient is Stable - Low risk of patient condition declining or worsening    Shift Goals  Clinical Goals: pulmonary hygiene, pain control, bowel motility  Patient Goals: pain control, rest  Family Goals: no family present    Progress made toward(s) clinical / shift goals:  pulmonary hygiene, pain control, bowel management    Patient is not progressing towards the following goals: n/a      Problem: Pain - Standard  Goal: Alleviation of pain or a reduction in pain to the patient’s comfort goal  Outcome: Progressing     Problem: Skin Integrity  Goal: Skin integrity is maintained or improved  Outcome: Progressing     Problem: Fall Risk  Goal: Patient will remain free from falls  Outcome: Progressing     Problem: Early Mobilization - Post Surgery  Goal: Early mobilization post surgery  Outcome: Met

## 2022-08-26 NOTE — CARE PLAN
Problem: Hyperinflation  Goal: Prevent or improve atelectasis  Description: Target End Date:  3 to 4 days    1. Instruct incentive spirometry usage  2.  Perform hyperinflation therapy as indicated  Outcome: Progressing  Flowsheets  Hyperinflation Protocol Goals/Outcome: Stable Vital Capacity x24 hrs and Patient Understands / uses I.S.  Hyperinflation Protocol Indications: Chest Trauma (Blunt, Penetrative, Crushing, or Surgical)       Respiratory Update    Treatment modality: PEP  Frequency: QID    IS: 1000    Pt tolerating current treatments well with no adverse reactions.
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
## 2022-08-26 NOTE — THERAPY
Physical Therapy Contact Note    Attempted to see pt for PT session. Per RN, pt up in the chair for multiple hours earlier in the day and is now asleep. Requested not to disturb. Will continue to follow up as able.    Blanka Oseguera, PT, DPT
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
## 2022-08-26 NOTE — PROGRESS NOTES
1100: IR team to bedside for thoracentesis. Ultrasound performed at bedside. Per IR team, plural effusion is not large enough to perform thoracentesis as likelihood of causing pneumo is high. Procedure aborted.

## 2022-08-26 NOTE — CARE PLAN
The patient is Stable - Low risk of patient condition declining or worsening    Shift Goals  Clinical Goals: pulmonary hygiene, pain control  Patient Goals: pain control  Family Goals: no family present    Progress made toward(s) clinical / shift goals:      Patient is not progressing towards the following goals:      Problem: Knowledge Deficit - Standard  Goal: Patient and family/care givers will demonstrate understanding of plan of care, disease process/condition, diagnostic tests and medications  Outcome: Progressing     Problem: Skin Integrity  Goal: Skin integrity is maintained or improved  Outcome: Progressing     Problem: Fall Risk  Goal: Patient will remain free from falls  Outcome: Progressing     Problem: Pain - Standard  Goal: Alleviation of pain or a reduction in pain to the patient’s comfort goal  Outcome: Progressing     Problem: Early Mobilization - Post Surgery  Goal: Early mobilization post surgery  Outcome: Progressing

## 2022-08-27 ENCOUNTER — APPOINTMENT (OUTPATIENT)
Dept: RADIOLOGY | Facility: MEDICAL CENTER | Age: 67
DRG: 958 | End: 2022-08-27
Attending: SURGERY
Payer: MEDICARE

## 2022-08-27 PROBLEM — M79.89 LEFT ARM SWELLING: Status: RESOLVED | Noted: 2022-08-17 | Resolved: 2022-08-27

## 2022-08-27 PROBLEM — Z02.9 DISCHARGE PLANNING ISSUES: Status: ACTIVE | Noted: 2022-08-27

## 2022-08-27 PROBLEM — Z75.8 DISCHARGE PLANNING ISSUES: Status: ACTIVE | Noted: 2022-08-27

## 2022-08-27 PROBLEM — R18.8 FREE FLUID IN PELVIS: Status: RESOLVED | Noted: 2022-08-17 | Resolved: 2022-08-27

## 2022-08-27 PROBLEM — S42.293A HUMERAL HEAD FRACTURE: Status: RESOLVED | Noted: 2022-08-17 | Resolved: 2022-08-27

## 2022-08-27 LAB
ALBUMIN SERPL BCP-MCNC: 2.2 G/DL (ref 3.2–4.9)
ALBUMIN/GLOB SERPL: 0.7 G/DL
ALP SERPL-CCNC: 99 U/L (ref 30–99)
ALT SERPL-CCNC: 35 U/L (ref 2–50)
ANION GAP SERPL CALC-SCNC: 5 MMOL/L (ref 7–16)
AST SERPL-CCNC: 40 U/L (ref 12–45)
BASOPHILS # BLD AUTO: 0.4 % (ref 0–1.8)
BASOPHILS # BLD: 0.07 K/UL (ref 0–0.12)
BILIRUB SERPL-MCNC: 1 MG/DL (ref 0.1–1.5)
BUN SERPL-MCNC: 14 MG/DL (ref 8–22)
CALCIUM SERPL-MCNC: 7.6 MG/DL (ref 8.5–10.5)
CHLORIDE SERPL-SCNC: 98 MMOL/L (ref 96–112)
CO2 SERPL-SCNC: 27 MMOL/L (ref 20–33)
CREAT SERPL-MCNC: 0.62 MG/DL (ref 0.5–1.4)
EOSINOPHIL # BLD AUTO: 0.14 K/UL (ref 0–0.51)
EOSINOPHIL NFR BLD: 0.8 % (ref 0–6.9)
ERYTHROCYTE [DISTWIDTH] IN BLOOD BY AUTOMATED COUNT: 59 FL (ref 35.9–50)
GFR SERPLBLD CREATININE-BSD FMLA CKD-EPI: 105 ML/MIN/1.73 M 2
GLOBULIN SER CALC-MCNC: 3 G/DL (ref 1.9–3.5)
GLUCOSE SERPL-MCNC: 150 MG/DL (ref 65–99)
HCT VFR BLD AUTO: 22.1 % (ref 42–52)
HGB BLD-MCNC: 7.1 G/DL (ref 14–18)
IMM GRANULOCYTES # BLD AUTO: 0.7 K/UL (ref 0–0.11)
IMM GRANULOCYTES NFR BLD AUTO: 4.2 % (ref 0–0.9)
LYMPHOCYTES # BLD AUTO: 1.57 K/UL (ref 1–4.8)
LYMPHOCYTES NFR BLD: 9.5 % (ref 22–41)
MCH RBC QN AUTO: 30.9 PG (ref 27–33)
MCHC RBC AUTO-ENTMCNC: 32.1 G/DL (ref 33.7–35.3)
MCV RBC AUTO: 96.1 FL (ref 81.4–97.8)
MONOCYTES # BLD AUTO: 1 K/UL (ref 0–0.85)
MONOCYTES NFR BLD AUTO: 6 % (ref 0–13.4)
NEUTROPHILS # BLD AUTO: 13.12 K/UL (ref 1.82–7.42)
NEUTROPHILS NFR BLD: 79.1 % (ref 44–72)
NRBC # BLD AUTO: 0 K/UL
NRBC BLD-RTO: 0 /100 WBC
PLATELET # BLD AUTO: 291 K/UL (ref 164–446)
PMV BLD AUTO: 10.2 FL (ref 9–12.9)
POTASSIUM SERPL-SCNC: 4.8 MMOL/L (ref 3.6–5.5)
PROT SERPL-MCNC: 5.2 G/DL (ref 6–8.2)
RBC # BLD AUTO: 2.3 M/UL (ref 4.7–6.1)
SODIUM SERPL-SCNC: 130 MMOL/L (ref 135–145)
WBC # BLD AUTO: 16.6 K/UL (ref 4.8–10.8)

## 2022-08-27 PROCEDURE — 700102 HCHG RX REV CODE 250 W/ 637 OVERRIDE(OP): Performed by: SPECIALIST

## 2022-08-27 PROCEDURE — A9270 NON-COVERED ITEM OR SERVICE: HCPCS | Performed by: NURSE PRACTITIONER

## 2022-08-27 PROCEDURE — 770001 HCHG ROOM/CARE - MED/SURG/GYN PRIV*

## 2022-08-27 PROCEDURE — A9270 NON-COVERED ITEM OR SERVICE: HCPCS | Performed by: SURGERY

## 2022-08-27 PROCEDURE — 700111 HCHG RX REV CODE 636 W/ 250 OVERRIDE (IP): Performed by: SURGERY

## 2022-08-27 PROCEDURE — 700102 HCHG RX REV CODE 250 W/ 637 OVERRIDE(OP): Performed by: SURGERY

## 2022-08-27 PROCEDURE — 700105 HCHG RX REV CODE 258: Performed by: SURGERY

## 2022-08-27 PROCEDURE — 71045 X-RAY EXAM CHEST 1 VIEW: CPT

## 2022-08-27 PROCEDURE — 700102 HCHG RX REV CODE 250 W/ 637 OVERRIDE(OP): Performed by: NURSE PRACTITIONER

## 2022-08-27 PROCEDURE — 700111 HCHG RX REV CODE 636 W/ 250 OVERRIDE (IP): Performed by: NURSE PRACTITIONER

## 2022-08-27 PROCEDURE — 94669 MECHANICAL CHEST WALL OSCILL: CPT

## 2022-08-27 PROCEDURE — 80053 COMPREHEN METABOLIC PANEL: CPT

## 2022-08-27 PROCEDURE — 85025 COMPLETE CBC W/AUTO DIFF WBC: CPT

## 2022-08-27 PROCEDURE — 99233 SBSQ HOSP IP/OBS HIGH 50: CPT | Performed by: NURSE PRACTITIONER

## 2022-08-27 PROCEDURE — 99024 POSTOP FOLLOW-UP VISIT: CPT | Performed by: SURGERY

## 2022-08-27 PROCEDURE — A9270 NON-COVERED ITEM OR SERVICE: HCPCS | Performed by: SPECIALIST

## 2022-08-27 RX ORDER — METAXALONE 800 MG/1
400 TABLET ORAL 3 TIMES DAILY PRN
Status: DISCONTINUED | OUTPATIENT
Start: 2022-08-27 | End: 2022-08-29

## 2022-08-27 RX ORDER — MORPHINE SULFATE 4 MG/ML
2 INJECTION INTRAVENOUS
Status: DISCONTINUED | OUTPATIENT
Start: 2022-08-27 | End: 2022-08-29

## 2022-08-27 RX ADMIN — MORPHINE SULFATE 2 MG: 4 INJECTION INTRAVENOUS at 14:45

## 2022-08-27 RX ADMIN — TAMSULOSIN HYDROCHLORIDE 0.4 MG: 0.4 CAPSULE ORAL at 10:00

## 2022-08-27 RX ADMIN — ENOXAPARIN SODIUM 30 MG: 30 INJECTION SUBCUTANEOUS at 17:07

## 2022-08-27 RX ADMIN — OXYCODONE HYDROCHLORIDE 10 MG: 10 TABLET ORAL at 17:07

## 2022-08-27 RX ADMIN — ACETAMINOPHEN 650 MG: 325 TABLET ORAL at 17:08

## 2022-08-27 RX ADMIN — MORPHINE SULFATE 2 MG: 4 INJECTION INTRAVENOUS at 08:35

## 2022-08-27 RX ADMIN — POTASSIUM CHLORIDE 40 MEQ: 20 TABLET, EXTENDED RELEASE ORAL at 06:33

## 2022-08-27 RX ADMIN — ACETAMINOPHEN 650 MG: 325 TABLET ORAL at 11:22

## 2022-08-27 RX ADMIN — MORPHINE SULFATE 2 MG: 4 INJECTION INTRAVENOUS at 19:29

## 2022-08-27 RX ADMIN — GABAPENTIN 100 MG: 100 CAPSULE ORAL at 17:07

## 2022-08-27 RX ADMIN — CEFEPIME 2 G: 2 INJECTION, POWDER, FOR SOLUTION INTRAVENOUS at 21:49

## 2022-08-27 RX ADMIN — OXYCODONE HYDROCHLORIDE 10 MG: 10 TABLET ORAL at 13:18

## 2022-08-27 RX ADMIN — MAGNESIUM HYDROXIDE 30 ML: 400 SUSPENSION ORAL at 06:35

## 2022-08-27 RX ADMIN — DOCUSATE SODIUM 100 MG: 100 CAPSULE, LIQUID FILLED ORAL at 06:34

## 2022-08-27 RX ADMIN — GABAPENTIN 100 MG: 100 CAPSULE ORAL at 06:33

## 2022-08-27 RX ADMIN — METAXALONE 400 MG: 800 TABLET ORAL at 06:34

## 2022-08-27 RX ADMIN — ENOXAPARIN SODIUM 30 MG: 30 INJECTION SUBCUTANEOUS at 06:33

## 2022-08-27 RX ADMIN — OXYCODONE HYDROCHLORIDE 10 MG: 10 TABLET ORAL at 10:08

## 2022-08-27 RX ADMIN — OXYCODONE HYDROCHLORIDE 10 MG: 10 TABLET ORAL at 06:34

## 2022-08-27 RX ADMIN — OXYCODONE HYDROCHLORIDE 10 MG: 10 TABLET ORAL at 01:06

## 2022-08-27 RX ADMIN — OXYCODONE HYDROCHLORIDE 10 MG: 10 TABLET ORAL at 21:01

## 2022-08-27 RX ADMIN — ACETAMINOPHEN 650 MG: 325 TABLET ORAL at 06:33

## 2022-08-27 RX ADMIN — CEFEPIME 2 G: 2 INJECTION, POWDER, FOR SOLUTION INTRAVENOUS at 06:35

## 2022-08-27 RX ADMIN — GABAPENTIN 100 MG: 100 CAPSULE ORAL at 11:22

## 2022-08-27 RX ADMIN — METAXALONE 400 MG: 800 TABLET ORAL at 18:37

## 2022-08-27 RX ADMIN — ASPIRIN 81 MG: 81 TABLET, CHEWABLE ORAL at 06:33

## 2022-08-27 RX ADMIN — CEFEPIME 2 G: 2 INJECTION, POWDER, FOR SOLUTION INTRAVENOUS at 14:36

## 2022-08-27 ASSESSMENT — ENCOUNTER SYMPTOMS
CHILLS: 0
SPEECH CHANGE: 0
MYALGIAS: 1
CARDIOVASCULAR NEGATIVE: 1
SHORTNESS OF BREATH: 1
NAUSEA: 0
EYES NEGATIVE: 1
VOMITING: 0
FEVER: 0
ABDOMINAL PAIN: 0

## 2022-08-27 ASSESSMENT — PAIN DESCRIPTION - PAIN TYPE
TYPE: ACUTE PAIN
TYPE: ACUTE PAIN
TYPE: ACUTE PAIN;CHRONIC PAIN;SURGICAL PAIN
TYPE: ACUTE PAIN;CHRONIC PAIN
TYPE: ACUTE PAIN;SURGICAL PAIN
TYPE: ACUTE PAIN;SURGICAL PAIN;CHRONIC PAIN
TYPE: ACUTE PAIN;CHRONIC PAIN;SURGICAL PAIN
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
## 2022-08-27 NOTE — CARE PLAN
Shift Goals  Clinical Goals: pain control/ plum hygiene  Patient Goals: rest / pain control  Family Goals: no family present    Medicating per MAR, encouraging pulm hygiene and educating on importance, educating on and setting a plan for mobility, clustering care and minimizing sleep interruptions.       Problem: Pain - Standard  Goal: Alleviation of pain or a reduction in pain to the patient’s comfort goal  Outcome: Progressing     Problem: Knowledge Deficit - Standard  Goal: Patient and family/care givers will demonstrate understanding of plan of care, disease process/condition, diagnostic tests and medications  Outcome: Progressing     Problem: Skin Integrity  Goal: Skin integrity is maintained or improved  Outcome: Progressing     Problem: Fall Risk  Goal: Patient will remain free from falls  Outcome: Progressing     Problem: Bowel Elimination - Post Surgical  Goal: Patient will resume regular bowel sounds and function with no discomfort or distention  Outcome: Progressing
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
## 2022-08-27 NOTE — ASSESSMENT & PLAN NOTE
Date of admission: 8/17/2022.  8/27 Transfer orders from SICU.  8/27 Rehab referral. [facility] is not a provider for [facility]. Referral to [facility] IRF.   Cleared for discharge: No.  Discharge delayed: No.  Discharge date: tbd.

## 2022-08-27 NOTE — PROGRESS NOTES
"   Orthopaedic Progress Note    Interval changes:  Patient doing well     LLE outer ace wrap changed yesterday    ROS - Patient denies any new issues.  Pain well controlled.    /65   Pulse 84   Temp 36.3 °C (97.4 °F) (Temporal)   Resp (!) 22   Ht 1.753 m (5' 9.02\")   Wt 85.6 kg (188 lb 11.4 oz)   SpO2 98%       Patient seen and examined  No acute distress  Breathing non labored  RRR  LLE proximal I&D dressings CDI. Distal LLE dressings CDI, DVNI, moves all toes, cap refill <2 sec.     Recent Labs     08/25/22  0506 08/26/22  0419 08/27/22  0338   WBC 16.9* 17.5* 16.6*   RBC 2.54* 2.41* 2.30*   HEMOGLOBIN 8.0* 7.6* 7.1*   HEMATOCRIT 24.0* 23.0* 22.1*   MCV 94.5 95.4 96.1   MCH 31.5 31.5 30.9   MCHC 33.3* 33.0* 32.1*   RDW 55.4* 58.0* 59.0*   PLATELETCT 216 256 291   MPV 9.4 9.8 10.2       Active Hospital Problems    Diagnosis     Bandemia [D72.825]      Priority: High    Fracture of ribs, seven, left, closed, initial encounter [S22.42XA]      Priority: High    Discharge planning issues [Z02.9]      Priority: Medium    Blood loss anemia [D50.0]      Priority: Medium    Type III open fracture of left tibia and fibula [S82.202C, S82.402C]      Priority: Medium    Penetrating injury of left lower extremity [S81.832A]      Priority: Medium    Traumatic tear of thoracic aorta [S25.01XA]      Priority: Medium    Closed fracture of posterior wall of left acetabulum (HCC) [S32.422A]      Priority: Medium    Trauma [T14.90XA]      Priority: Low    No contraindication to deep vein thrombosis (DVT) prophylaxis [Z78.9]      Priority: Low    Closed fracture of transverse process of lumbar vertebra (HCC) [S32.009A]      Priority: Low    Benign prostatic hyperplasia without lower urinary tract symptoms [N40.0]      Priority: Low       Assessment/Plan:  Patient doing well   POD#8 S/P:  1. Open treatment of left tibia shaft fracture with insertion of intramedullary implant  2.  Open external fixation left distal tibia " intra-articular fracture with fixation of fibula  3.  Removal external fixator under anesthesia  POD#10  S/P   1.  Irrigation debridement open fracture   2.  External fixation left leg   3.  Exploration left thigh penetrating wound  Wt bearing status - NWB LLE  Wound care/Drains - Dressings to be left in place  Future Procedures - non planned   Sutures/Staples out- 14 days post operatively  PT/OT-initiated  Antibiotics: completed  DVT Prophylaxis- TEDS/SCDs/Foot pumps  Levy-none  Case Coordination for Discharge Planning - Disposition pending final therapy recs

## 2022-08-27 NOTE — CARE PLAN
The patient is Stable - Low risk of patient condition declining or worsening    Shift Goals  Clinical Goals: pain control, pulmonary hygiene  Patient Goals: pain control  Family Goals: unable to assess    Progress made toward(s) clinical / shift goals:  Patient able to communicate pain levels, appropriate pain medication on MAR. Patient working with RT on IS and mobilizing.    Problem: Pain - Standard  Goal: Alleviation of pain or a reduction in pain to the patient’s comfort goal  Outcome: Progressing     Problem: Respiratory  Goal: Patient will achieve/maintain optimum respiratory ventilation and gas exchange  Outcome: Progressing     Patient is not progressing towards the following goals: n/a

## 2022-08-27 NOTE — CARE PLAN
Problem: Respiratory  Goal: Patient will achieve/maintain optimum respiratory ventilation and gas exchange  Description: Target End Date:  Prior to discharge or change in level of care    Document on Assessment flowsheet    1.  Assess and monitor rate, rhythm, depth and effort of respiration  2.  Breath sounds assessed qshift and/or as needed  3.  Assess O2 saturation, administer/titrate oxygen as ordered  4.  Position patient for maximum ventilatory efficiency  5.  Turn, cough, and deep breath with splinting to improve effectiveness  6.  Collaborate with RT to administer medication/treatments per order  7.  Encourage use of incentive spirometer and encourage patient to cough after use and utilize splinting techniques if applicable  8.  Airway suctioning  9.  Monitor sputum production for changes in color, consistency and frequency  10. Perform frequent oral hygiene  11. Alternate physical activity with rest periods  Outcome: Progressing. PEP/IS QID. Pt achieves 1250mL with good effort and technique.

## 2022-08-28 ENCOUNTER — APPOINTMENT (OUTPATIENT)
Dept: RADIOLOGY | Facility: MEDICAL CENTER | Age: 67
DRG: 958 | End: 2022-08-28
Attending: SURGERY
Payer: MEDICARE

## 2022-08-28 LAB
ALBUMIN SERPL BCP-MCNC: 2 G/DL (ref 3.2–4.9)
ALBUMIN/GLOB SERPL: 0.6 G/DL
ALP SERPL-CCNC: 101 U/L (ref 30–99)
ALT SERPL-CCNC: 33 U/L (ref 2–50)
ANION GAP SERPL CALC-SCNC: 6 MMOL/L (ref 7–16)
ANISOCYTOSIS BLD QL SMEAR: ABNORMAL
AST SERPL-CCNC: 40 U/L (ref 12–45)
BACTERIA SPEC RESP CULT: NORMAL
BASOPHILS # BLD AUTO: 0 % (ref 0–1.8)
BASOPHILS # BLD: 0 K/UL (ref 0–0.12)
BILIRUB SERPL-MCNC: 1 MG/DL (ref 0.1–1.5)
BUN SERPL-MCNC: 12 MG/DL (ref 8–22)
CALCIUM SERPL-MCNC: 7.8 MG/DL (ref 8.5–10.5)
CHLORIDE SERPL-SCNC: 101 MMOL/L (ref 96–112)
CO2 SERPL-SCNC: 26 MMOL/L (ref 20–33)
CREAT SERPL-MCNC: 0.65 MG/DL (ref 0.5–1.4)
EOSINOPHIL # BLD AUTO: 0.1 K/UL (ref 0–0.51)
EOSINOPHIL NFR BLD: 0.8 % (ref 0–6.9)
ERYTHROCYTE [DISTWIDTH] IN BLOOD BY AUTOMATED COUNT: 58.4 FL (ref 35.9–50)
GFR SERPLBLD CREATININE-BSD FMLA CKD-EPI: 104 ML/MIN/1.73 M 2
GLOBULIN SER CALC-MCNC: 3.6 G/DL (ref 1.9–3.5)
GLUCOSE SERPL-MCNC: 123 MG/DL (ref 65–99)
GRAM STN SPEC: NORMAL
HCT VFR BLD AUTO: 24 % (ref 42–52)
HGB BLD-MCNC: 7.7 G/DL (ref 14–18)
HYPOCHROMIA BLD QL SMEAR: ABNORMAL
LYMPHOCYTES # BLD AUTO: 0.43 K/UL (ref 1–4.8)
LYMPHOCYTES NFR BLD: 3.4 % (ref 22–41)
MACROCYTES BLD QL SMEAR: ABNORMAL
MANUAL DIFF BLD: NORMAL
MCH RBC QN AUTO: 30.9 PG (ref 27–33)
MCHC RBC AUTO-ENTMCNC: 32.1 G/DL (ref 33.7–35.3)
MCV RBC AUTO: 96.4 FL (ref 81.4–97.8)
METAMYELOCYTES NFR BLD MANUAL: 2.6 %
MONOCYTES # BLD AUTO: 0.33 K/UL (ref 0–0.85)
MONOCYTES NFR BLD AUTO: 2.6 % (ref 0–13.4)
MORPHOLOGY BLD-IMP: NORMAL
MYELOCYTES NFR BLD MANUAL: 0.9 %
NEUTROPHILS # BLD AUTO: 11.21 K/UL (ref 1.82–7.42)
NEUTROPHILS NFR BLD: 88.8 % (ref 44–72)
NEUTS BAND NFR BLD MANUAL: 0.9 % (ref 0–10)
NRBC # BLD AUTO: 0 K/UL
NRBC BLD-RTO: 0 /100 WBC
PLATELET # BLD AUTO: 382 K/UL (ref 164–446)
PLATELET BLD QL SMEAR: NORMAL
PMV BLD AUTO: 9.9 FL (ref 9–12.9)
POTASSIUM SERPL-SCNC: 4.6 MMOL/L (ref 3.6–5.5)
PROT SERPL-MCNC: 5.6 G/DL (ref 6–8.2)
RBC # BLD AUTO: 2.49 M/UL (ref 4.7–6.1)
RBC BLD AUTO: PRESENT
SIGNIFICANT IND 70042: NORMAL
SITE SITE: NORMAL
SODIUM SERPL-SCNC: 133 MMOL/L (ref 135–145)
SOURCE SOURCE: NORMAL
WBC # BLD AUTO: 12.5 K/UL (ref 4.8–10.8)

## 2022-08-28 PROCEDURE — 99024 POSTOP FOLLOW-UP VISIT: CPT | Performed by: SURGERY

## 2022-08-28 PROCEDURE — 94669 MECHANICAL CHEST WALL OSCILL: CPT

## 2022-08-28 PROCEDURE — 700102 HCHG RX REV CODE 250 W/ 637 OVERRIDE(OP): Performed by: SURGERY

## 2022-08-28 PROCEDURE — 85025 COMPLETE CBC W/AUTO DIFF WBC: CPT

## 2022-08-28 PROCEDURE — 99232 SBSQ HOSP IP/OBS MODERATE 35: CPT | Performed by: NURSE PRACTITIONER

## 2022-08-28 PROCEDURE — 85007 BL SMEAR W/DIFF WBC COUNT: CPT

## 2022-08-28 PROCEDURE — 700105 HCHG RX REV CODE 258: Performed by: SURGERY

## 2022-08-28 PROCEDURE — A9270 NON-COVERED ITEM OR SERVICE: HCPCS | Performed by: NURSE PRACTITIONER

## 2022-08-28 PROCEDURE — 700111 HCHG RX REV CODE 636 W/ 250 OVERRIDE (IP): Performed by: SURGERY

## 2022-08-28 PROCEDURE — 94760 N-INVAS EAR/PLS OXIMETRY 1: CPT

## 2022-08-28 PROCEDURE — 700102 HCHG RX REV CODE 250 W/ 637 OVERRIDE(OP): Performed by: NURSE PRACTITIONER

## 2022-08-28 PROCEDURE — 770001 HCHG ROOM/CARE - MED/SURG/GYN PRIV*

## 2022-08-28 PROCEDURE — A9270 NON-COVERED ITEM OR SERVICE: HCPCS | Performed by: SURGERY

## 2022-08-28 PROCEDURE — 700111 HCHG RX REV CODE 636 W/ 250 OVERRIDE (IP): Performed by: NURSE PRACTITIONER

## 2022-08-28 PROCEDURE — 80053 COMPREHEN METABOLIC PANEL: CPT

## 2022-08-28 PROCEDURE — A9270 NON-COVERED ITEM OR SERVICE: HCPCS | Performed by: SPECIALIST

## 2022-08-28 PROCEDURE — 700101 HCHG RX REV CODE 250: Performed by: SPECIALIST

## 2022-08-28 PROCEDURE — 71045 X-RAY EXAM CHEST 1 VIEW: CPT

## 2022-08-28 PROCEDURE — 700102 HCHG RX REV CODE 250 W/ 637 OVERRIDE(OP): Performed by: SPECIALIST

## 2022-08-28 PROCEDURE — 99223 1ST HOSP IP/OBS HIGH 75: CPT | Performed by: PHYSICAL MEDICINE & REHABILITATION

## 2022-08-28 RX ADMIN — LIDOCAINE 1 PATCH: 50 PATCH TOPICAL at 23:46

## 2022-08-28 RX ADMIN — TAMSULOSIN HYDROCHLORIDE 0.4 MG: 0.4 CAPSULE ORAL at 08:50

## 2022-08-28 RX ADMIN — OXYCODONE HYDROCHLORIDE 10 MG: 10 TABLET ORAL at 10:04

## 2022-08-28 RX ADMIN — CEFEPIME 2 G: 2 INJECTION, POWDER, FOR SOLUTION INTRAVENOUS at 14:00

## 2022-08-28 RX ADMIN — ACETAMINOPHEN 650 MG: 325 TABLET ORAL at 00:03

## 2022-08-28 RX ADMIN — LIDOCAINE 1 PATCH: 50 PATCH TOPICAL at 00:03

## 2022-08-28 RX ADMIN — MORPHINE SULFATE 2 MG: 4 INJECTION INTRAVENOUS at 12:32

## 2022-08-28 RX ADMIN — CEFEPIME 2 G: 2 INJECTION, POWDER, FOR SOLUTION INTRAVENOUS at 22:48

## 2022-08-28 RX ADMIN — ACETAMINOPHEN 650 MG: 325 TABLET ORAL at 12:32

## 2022-08-28 RX ADMIN — METAXALONE 400 MG: 800 TABLET ORAL at 03:55

## 2022-08-28 RX ADMIN — ACETAMINOPHEN 650 MG: 325 TABLET ORAL at 23:45

## 2022-08-28 RX ADMIN — ENOXAPARIN SODIUM 30 MG: 30 INJECTION SUBCUTANEOUS at 06:06

## 2022-08-28 RX ADMIN — MORPHINE SULFATE 2 MG: 4 INJECTION INTRAVENOUS at 06:08

## 2022-08-28 RX ADMIN — GABAPENTIN 100 MG: 100 CAPSULE ORAL at 06:07

## 2022-08-28 RX ADMIN — CEFEPIME 2 G: 2 INJECTION, POWDER, FOR SOLUTION INTRAVENOUS at 06:09

## 2022-08-28 RX ADMIN — ACETAMINOPHEN 650 MG: 325 TABLET ORAL at 06:07

## 2022-08-28 RX ADMIN — MORPHINE SULFATE 2 MG: 4 INJECTION INTRAVENOUS at 18:15

## 2022-08-28 RX ADMIN — ASPIRIN 81 MG: 81 TABLET, CHEWABLE ORAL at 06:06

## 2022-08-28 RX ADMIN — ACETAMINOPHEN 650 MG: 325 TABLET ORAL at 17:57

## 2022-08-28 RX ADMIN — MORPHINE SULFATE 2 MG: 4 INJECTION INTRAVENOUS at 22:48

## 2022-08-28 RX ADMIN — OXYCODONE HYDROCHLORIDE 10 MG: 10 TABLET ORAL at 23:45

## 2022-08-28 RX ADMIN — GABAPENTIN 100 MG: 100 CAPSULE ORAL at 17:56

## 2022-08-28 RX ADMIN — OXYCODONE HYDROCHLORIDE 10 MG: 10 TABLET ORAL at 00:03

## 2022-08-28 RX ADMIN — GABAPENTIN 100 MG: 100 CAPSULE ORAL at 12:32

## 2022-08-28 RX ADMIN — ENOXAPARIN SODIUM 30 MG: 30 INJECTION SUBCUTANEOUS at 18:00

## 2022-08-28 RX ADMIN — OXYCODONE HYDROCHLORIDE 10 MG: 10 TABLET ORAL at 03:54

## 2022-08-28 RX ADMIN — OXYCODONE HYDROCHLORIDE 10 MG: 10 TABLET ORAL at 16:23

## 2022-08-28 RX ADMIN — OXYCODONE HYDROCHLORIDE 10 MG: 10 TABLET ORAL at 20:49

## 2022-08-28 ASSESSMENT — LIFESTYLE VARIABLES
AVERAGE NUMBER OF DAYS PER WEEK YOU HAVE A DRINK CONTAINING ALCOHOL: 0
HOW MANY TIMES IN THE PAST YEAR HAVE YOU HAD 5 OR MORE DRINKS IN A DAY: 0
CONSUMPTION TOTAL: INCOMPLETE
ON A TYPICAL DAY WHEN YOU DRINK ALCOHOL HOW MANY DRINKS DO YOU HAVE: 1
EVER FELT BAD OR GUILTY ABOUT YOUR DRINKING: NO
HAVE YOU EVER FELT YOU SHOULD CUT DOWN ON YOUR DRINKING: YES
DOES PATIENT WANT TO STOP DRINKING: NO
EVER HAD A DRINK FIRST THING IN THE MORNING TO STEADY YOUR NERVES TO GET RID OF A HANGOVER: NO

## 2022-08-28 ASSESSMENT — ENCOUNTER SYMPTOMS
MYALGIAS: 1
FEVER: 0
NAUSEA: 0
EYES NEGATIVE: 1
SPEECH CHANGE: 0
ABDOMINAL PAIN: 0
VOMITING: 0
CARDIOVASCULAR NEGATIVE: 1
SHORTNESS OF BREATH: 1
CHILLS: 0

## 2022-08-28 ASSESSMENT — PATIENT HEALTH QUESTIONNAIRE - PHQ9
SUM OF ALL RESPONSES TO PHQ9 QUESTIONS 1 AND 2: 0
1. LITTLE INTEREST OR PLEASURE IN DOING THINGS: NOT AT ALL
2. FEELING DOWN, DEPRESSED, IRRITABLE, OR HOPELESS: NOT AT ALL

## 2022-08-28 ASSESSMENT — PAIN DESCRIPTION - PAIN TYPE
TYPE: ACUTE PAIN

## 2022-08-28 NOTE — PROGRESS NOTES
4 Eyes Skin Assessment Completed by KARMA Richardson and KARMA Huston.    Head WDL  Ears WDL  Nose WDL  Mouth WDL  Neck WDL  Breast/Chest WDL  Shoulder Blades Redness and Blanching  Spine WDL  (R) Arm/Elbow/Hand Bruising and Abrasion  (L) Arm/Elbow/Hand Bruising, Abrasion, and Scab  Abdomen WDL  Groin WDL  Scrotum/Coccyx/Buttocks Redness and Blanching  (R) Leg Bruising and Abrasion  (L) Leg Bruising and Abrasion  (R) Heel/Foot/Toe Redness  (L) Heel/Foot/Toe Redness          Devices In Places Pulse Ox      Interventions In Place Gray Ear Foams    Possible Skin Injury No    Pictures Uploaded Into Epic N/A  Wound Consult Placed N/A  RN Wound Prevention Protocol Ordered No

## 2022-08-28 NOTE — CARE PLAN
The patient is Stable - Low risk of patient condition declining or worsening    Shift Goals  Clinical Goals: pain control, emotional support  Patient Goals: Go home  Family Goals: No family at bedside    Progress made toward(s) clinical / shift goals:  Pain has been controlled and patient was provided with emotional support     Patient is not progressing towards the following goals:
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
## 2022-08-28 NOTE — CARE PLAN
The patient is Stable - Low risk of patient condition declining or worsening    Shift Goals  Clinical Goals: pain control, pulmonary hygiene  Patient Goals: pain control  Family Goals: unable to assess    Progress made toward(s) clinical / shift goals:    Problem: Knowledge Deficit - Standard  Goal: Patient and family/care givers will demonstrate understanding of plan of care, disease process/condition, diagnostic tests and medications  Outcome: Progressing     Problem: Skin Integrity  Goal: Skin integrity is maintained or improved  Outcome: Progressing     Problem: Fall Risk  Goal: Patient will remain free from falls  Outcome: Progressing     Problem: Pain - Standard  Goal: Alleviation of pain or a reduction in pain to the patient’s comfort goal  Outcome: Progressing

## 2022-08-28 NOTE — PROGRESS NOTES
"   Orthopaedic Progress Note    Interval changes:  Patient doing well     LLE dressings CDI    ROS - Patient denies any new issues.  Pain well controlled.    /68   Pulse 78   Temp 36.6 °C (97.8 °F) (Temporal)   Resp 20   Ht 1.753 m (5' 9.02\")   Wt 85.6 kg (188 lb 11.4 oz)   SpO2 98%       Patient seen and examined  No acute distress  Breathing non labored  RRR  LLE proximal I&D dressings CDI. Distal LLE dressings CDI, DVNI, moves all toes, cap refill <2 sec.     Recent Labs     08/26/22  0419 08/27/22  0338 08/28/22  0531   WBC 17.5* 16.6* 12.5*   RBC 2.41* 2.30* 2.49*   HEMOGLOBIN 7.6* 7.1* 7.7*   HEMATOCRIT 23.0* 22.1* 24.0*   MCV 95.4 96.1 96.4   MCH 31.5 30.9 30.9   MCHC 33.0* 32.1* 32.1*   RDW 58.0* 59.0* 58.4*   PLATELETCT 256 291 382   MPV 9.8 10.2 9.9       Active Hospital Problems    Diagnosis     Bandemia [D72.825]      Priority: High    Fracture of ribs, seven, left, closed, initial encounter [S22.42XA]      Priority: High    Discharge planning issues [Z02.9]      Priority: Medium    Blood loss anemia [D50.0]      Priority: Medium    Type III open fracture of left tibia and fibula [S82.202C, S82.402C]      Priority: Medium    Penetrating injury of left lower extremity [S81.832A]      Priority: Medium    Traumatic tear of thoracic aorta [S25.01XA]      Priority: Medium    Closed fracture of posterior wall of left acetabulum (HCC) [S32.422A]      Priority: Medium    Trauma [T14.90XA]      Priority: Low    No contraindication to deep vein thrombosis (DVT) prophylaxis [Z78.9]      Priority: Low    Closed fracture of transverse process of lumbar vertebra (HCC) [S32.009A]      Priority: Low    Benign prostatic hyperplasia without lower urinary tract symptoms [N40.0]      Priority: Low       Assessment/Plan:  Patient doing well   POD#9 S/P:  1. Open treatment of left tibia shaft fracture with insertion of intramedullary implant  2.  Open external fixation left distal tibia intra-articular fracture " with fixation of fibula  3.  Removal external fixator under anesthesia  POD#11  S/P   1.  Irrigation debridement open fracture   2.  External fixation left leg   3.  Exploration left thigh penetrating wound  Wt bearing status - NWB LLE  Wound care/Drains - Dressings to be left in place  Future Procedures - non planned   Sutures/Staples out- 14 days post operatively  PT/OT-initiated  Antibiotics: completed  DVT Prophylaxis- TEDS/SCDs/Foot pumps  Levy-none  Case Coordination for Discharge Planning - Disposition pending final therapy recs

## 2022-08-28 NOTE — CARE PLAN
Problem: Knowledge Deficit - Standard  Goal: Patient and family/care givers will demonstrate understanding of plan of care, disease process/condition, diagnostic tests and medications  Outcome: Met     Problem: Skin Integrity  Goal: Skin integrity is maintained or improved  Outcome: Met     Problem: Fall Risk  Goal: Patient will remain free from falls  Outcome: Met     Problem: Pain - Standard  Goal: Alleviation of pain or a reduction in pain to the patient’s comfort goal  Outcome: Met     Problem: Bowel Elimination - Post Surgical  Goal: Patient will resume regular bowel sounds and function with no discomfort or distention  Outcome: Met     Problem: Respiratory  Goal: Patient will achieve/maintain optimum respiratory ventilation and gas exchange  Outcome: Met   The patient is Stable - Low risk of patient condition declining or worsening    Shift Goals  Clinical Goals: pain control  Patient Goals: rest  Family Goals: No family at bedside    Progress made toward(s) clinical / shift goals:  Today this pt has had some stated pain in which he has been medicated and stable VS. He gotten up once since being transferred to this unit and he walked to the bathroom in which he was sob and had to use a wheelchair to get back to the bed after trying for a BM. He otherwise has been in his room awaiting for the next plan.

## 2022-08-29 ENCOUNTER — APPOINTMENT (OUTPATIENT)
Dept: RADIOLOGY | Facility: MEDICAL CENTER | Age: 67
DRG: 958 | End: 2022-08-29
Attending: SURGERY
Payer: MEDICARE

## 2022-08-29 PROBLEM — S32.009A CLOSED FRACTURE OF TRANSVERSE PROCESS OF LUMBAR VERTEBRA (HCC): Status: RESOLVED | Noted: 2022-08-17 | Resolved: 2022-08-29

## 2022-08-29 LAB
ALBUMIN SERPL BCP-MCNC: 2.1 G/DL (ref 3.2–4.9)
ALBUMIN/GLOB SERPL: 0.6 G/DL
ALP SERPL-CCNC: 114 U/L (ref 30–99)
ALT SERPL-CCNC: 33 U/L (ref 2–50)
ANION GAP SERPL CALC-SCNC: 7 MMOL/L (ref 7–16)
ANISOCYTOSIS BLD QL SMEAR: ABNORMAL
AST SERPL-CCNC: 34 U/L (ref 12–45)
BASOPHILS # BLD AUTO: 0.9 % (ref 0–1.8)
BASOPHILS # BLD: 0.13 K/UL (ref 0–0.12)
BILIRUB SERPL-MCNC: 0.8 MG/DL (ref 0.1–1.5)
BUN SERPL-MCNC: 18 MG/DL (ref 8–22)
CALCIUM SERPL-MCNC: 8.1 MG/DL (ref 8.5–10.5)
CHLORIDE SERPL-SCNC: 101 MMOL/L (ref 96–112)
CO2 SERPL-SCNC: 26 MMOL/L (ref 20–33)
CREAT SERPL-MCNC: 0.62 MG/DL (ref 0.5–1.4)
EOSINOPHIL # BLD AUTO: 0 K/UL (ref 0–0.51)
EOSINOPHIL NFR BLD: 0 % (ref 0–6.9)
ERYTHROCYTE [DISTWIDTH] IN BLOOD BY AUTOMATED COUNT: 60.6 FL (ref 35.9–50)
GFR SERPLBLD CREATININE-BSD FMLA CKD-EPI: 105 ML/MIN/1.73 M 2
GLOBULIN SER CALC-MCNC: 3.5 G/DL (ref 1.9–3.5)
GLUCOSE SERPL-MCNC: 118 MG/DL (ref 65–99)
HCT VFR BLD AUTO: 24.4 % (ref 42–52)
HGB BLD-MCNC: 7.8 G/DL (ref 14–18)
HYPOCHROMIA BLD QL SMEAR: ABNORMAL
LYMPHOCYTES # BLD AUTO: 1.22 K/UL (ref 1–4.8)
LYMPHOCYTES NFR BLD: 8.8 % (ref 22–41)
MACROCYTES BLD QL SMEAR: ABNORMAL
MANUAL DIFF BLD: NORMAL
MCH RBC QN AUTO: 31.2 PG (ref 27–33)
MCHC RBC AUTO-ENTMCNC: 32 G/DL (ref 33.7–35.3)
MCV RBC AUTO: 97.6 FL (ref 81.4–97.8)
MONOCYTES # BLD AUTO: 0.99 K/UL (ref 0–0.85)
MONOCYTES NFR BLD AUTO: 7.1 % (ref 0–13.4)
MORPHOLOGY BLD-IMP: NORMAL
NEUTROPHILS # BLD AUTO: 11.56 K/UL (ref 1.82–7.42)
NEUTROPHILS NFR BLD: 83.2 % (ref 44–72)
NRBC # BLD AUTO: 0.02 K/UL
NRBC BLD-RTO: 0.1 /100 WBC
PLATELET # BLD AUTO: 450 K/UL (ref 164–446)
PLATELET BLD QL SMEAR: NORMAL
PMV BLD AUTO: 9.9 FL (ref 9–12.9)
POLYCHROMASIA BLD QL SMEAR: NORMAL
POTASSIUM SERPL-SCNC: 4.4 MMOL/L (ref 3.6–5.5)
PROT SERPL-MCNC: 5.6 G/DL (ref 6–8.2)
RBC # BLD AUTO: 2.5 M/UL (ref 4.7–6.1)
RBC BLD AUTO: PRESENT
SODIUM SERPL-SCNC: 134 MMOL/L (ref 135–145)
WBC # BLD AUTO: 13.9 K/UL (ref 4.8–10.8)

## 2022-08-29 PROCEDURE — 700105 HCHG RX REV CODE 258: Performed by: SURGERY

## 2022-08-29 PROCEDURE — 700111 HCHG RX REV CODE 636 W/ 250 OVERRIDE (IP): Performed by: SURGERY

## 2022-08-29 PROCEDURE — 97530 THERAPEUTIC ACTIVITIES: CPT

## 2022-08-29 PROCEDURE — 700102 HCHG RX REV CODE 250 W/ 637 OVERRIDE(OP): Performed by: SURGERY

## 2022-08-29 PROCEDURE — 700102 HCHG RX REV CODE 250 W/ 637 OVERRIDE(OP): Performed by: SPECIALIST

## 2022-08-29 PROCEDURE — 700102 HCHG RX REV CODE 250 W/ 637 OVERRIDE(OP): Performed by: NURSE PRACTITIONER

## 2022-08-29 PROCEDURE — 700111 HCHG RX REV CODE 636 W/ 250 OVERRIDE (IP): Performed by: NURSE PRACTITIONER

## 2022-08-29 PROCEDURE — 94669 MECHANICAL CHEST WALL OSCILL: CPT

## 2022-08-29 PROCEDURE — A9270 NON-COVERED ITEM OR SERVICE: HCPCS | Performed by: NURSE PRACTITIONER

## 2022-08-29 PROCEDURE — 99233 SBSQ HOSP IP/OBS HIGH 50: CPT | Performed by: NURSE PRACTITIONER

## 2022-08-29 PROCEDURE — A9270 NON-COVERED ITEM OR SERVICE: HCPCS | Performed by: SURGERY

## 2022-08-29 PROCEDURE — A9270 NON-COVERED ITEM OR SERVICE: HCPCS | Performed by: SPECIALIST

## 2022-08-29 PROCEDURE — 80053 COMPREHEN METABOLIC PANEL: CPT

## 2022-08-29 PROCEDURE — 770001 HCHG ROOM/CARE - MED/SURG/GYN PRIV*

## 2022-08-29 PROCEDURE — 85025 COMPLETE CBC W/AUTO DIFF WBC: CPT

## 2022-08-29 PROCEDURE — 85007 BL SMEAR W/DIFF WBC COUNT: CPT

## 2022-08-29 PROCEDURE — 71045 X-RAY EXAM CHEST 1 VIEW: CPT

## 2022-08-29 RX ORDER — CELECOXIB 200 MG/1
200 CAPSULE ORAL DAILY
Status: DISCONTINUED | OUTPATIENT
Start: 2022-08-29 | End: 2022-08-30 | Stop reason: HOSPADM

## 2022-08-29 RX ORDER — MORPHINE SULFATE 15 MG/1
15 TABLET, FILM COATED, EXTENDED RELEASE ORAL EVERY 12 HOURS
Status: DISCONTINUED | OUTPATIENT
Start: 2022-08-29 | End: 2022-08-30 | Stop reason: HOSPADM

## 2022-08-29 RX ORDER — METHOCARBAMOL 750 MG/1
750 TABLET, FILM COATED ORAL 4 TIMES DAILY
Status: DISCONTINUED | OUTPATIENT
Start: 2022-08-29 | End: 2022-08-30 | Stop reason: HOSPADM

## 2022-08-29 RX ORDER — AMOXICILLIN AND CLAVULANATE POTASSIUM 875; 125 MG/1; MG/1
1 TABLET, FILM COATED ORAL EVERY 12 HOURS
Status: DISCONTINUED | OUTPATIENT
Start: 2022-08-29 | End: 2022-08-30 | Stop reason: HOSPADM

## 2022-08-29 RX ORDER — GABAPENTIN 300 MG/1
300 CAPSULE ORAL 3 TIMES DAILY
Status: DISCONTINUED | OUTPATIENT
Start: 2022-08-29 | End: 2022-08-30 | Stop reason: HOSPADM

## 2022-08-29 RX ADMIN — MORPHINE SULFATE 15 MG: 15 TABLET, EXTENDED RELEASE ORAL at 17:46

## 2022-08-29 RX ADMIN — GABAPENTIN 300 MG: 300 CAPSULE ORAL at 11:02

## 2022-08-29 RX ADMIN — AMOXICILLIN AND CLAVULANATE POTASSIUM 1 TABLET: 875; 125 TABLET, FILM COATED ORAL at 17:49

## 2022-08-29 RX ADMIN — ACETAMINOPHEN 650 MG: 325 TABLET ORAL at 17:46

## 2022-08-29 RX ADMIN — ACETAMINOPHEN 650 MG: 325 TABLET ORAL at 11:03

## 2022-08-29 RX ADMIN — METHOCARBAMOL 750 MG: 750 TABLET ORAL at 13:23

## 2022-08-29 RX ADMIN — METHOCARBAMOL 750 MG: 750 TABLET ORAL at 20:23

## 2022-08-29 RX ADMIN — ONDANSETRON 4 MG: 4 TABLET, ORALLY DISINTEGRATING ORAL at 17:49

## 2022-08-29 RX ADMIN — OXYCODONE HYDROCHLORIDE 10 MG: 10 TABLET ORAL at 11:02

## 2022-08-29 RX ADMIN — ACETAMINOPHEN 650 MG: 325 TABLET ORAL at 06:23

## 2022-08-29 RX ADMIN — AMOXICILLIN AND CLAVULANATE POTASSIUM 1 TABLET: 875; 125 TABLET, FILM COATED ORAL at 08:19

## 2022-08-29 RX ADMIN — METHOCARBAMOL 750 MG: 750 TABLET ORAL at 08:19

## 2022-08-29 RX ADMIN — ASPIRIN 81 MG: 81 TABLET, CHEWABLE ORAL at 06:23

## 2022-08-29 RX ADMIN — MORPHINE SULFATE 15 MG: 15 TABLET, EXTENDED RELEASE ORAL at 11:02

## 2022-08-29 RX ADMIN — GABAPENTIN 300 MG: 300 CAPSULE ORAL at 17:53

## 2022-08-29 RX ADMIN — OXYCODONE HYDROCHLORIDE 10 MG: 10 TABLET ORAL at 17:46

## 2022-08-29 RX ADMIN — MORPHINE SULFATE 2 MG: 4 INJECTION INTRAVENOUS at 08:18

## 2022-08-29 RX ADMIN — OXYCODONE HYDROCHLORIDE 10 MG: 10 TABLET ORAL at 06:22

## 2022-08-29 RX ADMIN — TAMSULOSIN HYDROCHLORIDE 0.4 MG: 0.4 CAPSULE ORAL at 08:19

## 2022-08-29 RX ADMIN — METHOCARBAMOL 750 MG: 750 TABLET ORAL at 17:46

## 2022-08-29 RX ADMIN — OXYCODONE 5 MG: 5 TABLET ORAL at 20:54

## 2022-08-29 RX ADMIN — CEFEPIME 2 G: 2 INJECTION, POWDER, FOR SOLUTION INTRAVENOUS at 06:23

## 2022-08-29 RX ADMIN — ENOXAPARIN SODIUM 30 MG: 30 INJECTION SUBCUTANEOUS at 17:47

## 2022-08-29 RX ADMIN — GABAPENTIN 100 MG: 100 CAPSULE ORAL at 06:23

## 2022-08-29 RX ADMIN — CELECOXIB 200 MG: 200 CAPSULE ORAL at 08:19

## 2022-08-29 ASSESSMENT — PAIN DESCRIPTION - PAIN TYPE
TYPE: ACUTE PAIN
TYPE: SURGICAL PAIN
TYPE: ACUTE PAIN

## 2022-08-29 ASSESSMENT — COGNITIVE AND FUNCTIONAL STATUS - GENERAL
TURNING FROM BACK TO SIDE WHILE IN FLAT BAD: A LITTLE
DAILY ACTIVITIY SCORE: 17
TURNING FROM BACK TO SIDE WHILE IN FLAT BAD: A LITTLE
WALKING IN HOSPITAL ROOM: A LOT
SUGGESTED CMS G CODE MODIFIER MOBILITY: CK
STANDING UP FROM CHAIR USING ARMS: A LITTLE
CLIMB 3 TO 5 STEPS WITH RAILING: A LOT
DRESSING REGULAR LOWER BODY CLOTHING: A LOT
MOVING TO AND FROM BED TO CHAIR: A LITTLE
SUGGESTED CMS G CODE MODIFIER MOBILITY: CL
MOVING FROM LYING ON BACK TO SITTING ON SIDE OF FLAT BED: UNABLE
MOBILITY SCORE: 17
TOILETING: A LITTLE
CLIMB 3 TO 5 STEPS WITH RAILING: A LITTLE
DRESSING REGULAR UPPER BODY CLOTHING: A LOT
WALKING IN HOSPITAL ROOM: A LOT
SUGGESTED CMS G CODE MODIFIER DAILY ACTIVITY: CK
MOBILITY SCORE: 14
MOVING FROM LYING ON BACK TO SITTING ON SIDE OF FLAT BED: A LITTLE
MOVING TO AND FROM BED TO CHAIR: A LITTLE
STANDING UP FROM CHAIR USING ARMS: A LITTLE
HELP NEEDED FOR BATHING: A LOT

## 2022-08-29 ASSESSMENT — ENCOUNTER SYMPTOMS
NEUROLOGICAL NEGATIVE: 1
MYALGIAS: 1
ROS GI COMMENTS: BM 8/28
RESPIRATORY NEGATIVE: 1
PSYCHIATRIC NEGATIVE: 1

## 2022-08-29 ASSESSMENT — GAIT ASSESSMENTS: GAIT LEVEL OF ASSIST: UNABLE TO PARTICIPATE

## 2022-08-29 NOTE — CARE PLAN
The patient is Stable - Low risk of patient condition declining or worsening    Shift Goals  Clinical Goals: pain control  Patient Goals: pain control, rest    Progress made toward(s) clinical / shift goals:     Patient is not progressing towards the following goals:  Pt having a hard time with pain control despite all PRN and scheduled medications given. Heat packs, repositioning and distraction all provided to pt.
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
## 2022-08-29 NOTE — PROGRESS NOTES
Bedside report received.  Assessment complete.  A&O x 4. Patient calls appropriately.  Patient ambulates with x1 assist and FWW.  Patient has 10/10 pain. Pain managed with prescribed medications.  Denies N&V. Tolerating regular diet.  LLE with dressing, CDI, CMS intact. Generalized abrasions and bruising.  + void, + flatus, - BM.  Patient denies SOB.  Review plan with of care with patient. Call light and personal belongings within reach. Hourly rounding in place. All needs met at this time.
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
## 2022-08-29 NOTE — PROGRESS NOTES
Assumed care of patient at 0645. Bedside report received. Assessment complete.  AA&Ox4. Denies CP/SOB.  Reporting 6/10 pain. Medicated per MAR.Educated patient regarding pharmacologic and non pharmacologic modalities for pain management.  Skin per flowsheets  Tolerating regular diet. Denies N/V.  + void. Last BM 8/28  Pt ambulates LLE TTWB   All needs met at this time. Call light within reach. Pt calls appropriately. Bed low and locked, non skid socks in place. Hourly rounding in place.

## 2022-08-29 NOTE — THERAPY
"Physical Therapy   Daily Treatment     Patient Name: Ezekiel Sanz  Age:  66 y.o., Sex:  male  Medical Record #: [id number]  Today's Date: 8/29/2022     Precautions  Precautions: Fall Risk;Toe Touch Weight Bearing Left Lower Extremity;Weight Bearing As Tolerated Left Upper Extremity  Comments: aortic injury. WBAT LUE per trauma APRN.     Assessment    Pt progressing as expected. Spoke with trauma APRN, now WBAT LUE. Ortho PA confirming pt TTWB LLE. RN to clarify LLE WB status. Pt mobilized as detailed below. Pt fatigued with transfer <> BSM, noted SOB and increased WOB with Spo2 90%- RN aware. Pt reports he has ambulated to BR with RN staff, difficulty returning and required W/c push. Pt continues to present with L sided weakness, pain, decreased activity tolerance, endurance, and balance impacting pt's functional mobility- would benefit from post acute placement. Will continue to follow to address above deficits.    Plan    Continue current treatment plan.    DC Equipment Recommendations: Unable to determine at this time  Discharge Recommendations: Recommend post-acute placement for additional physical therapy services prior to discharge home      Subjective    \"I'm not used to having two women watching me while I try to go to the bathroom.\"      Objective     08/29/22 1052   Vitals   O2 (LPM) 2   O2 Delivery Device Nasal Cannula   Vitals Comments SpO2 90% after transfer <> BSC   Pain 0 - 10 Group   Therapist Pain Assessment Post Activity Pain Same as Prior to Activity;Nurse Notified  (c/o L sided pain not rated, agreeable to mobility)   Cognition    Cognition / Consciousness WDL   Level of Consciousness Alert   Comments Pleasant and cooperative. Motivated   Passive ROM Lower Body   Passive ROM Lower Body X   Comments ankle limited 2/2 bandaging   Active ROM Lower Body    Active ROM Lower Body  X   Comments same as above   Strength Lower Body   Lower Body Strength  X   Comments LLE grossly 3+/5 (no overpressure " provided), ankle DF/PF not assessed. RLE WDL   Sensation Lower Body   Lower Extremity Sensation   WDL   Neuro-Muscular Treatments   Neuro-Muscular Treatments Weight Shift Right   Comments manual and verbal cues to weight shift R to offload LLE. Cues to maintain TTWB LLE   Vision   Vision Comments C/o blurred vision, removed old reading glasses with improvement   Other Treatments   Other Treatments Provided Assisted with balance while pt performing standing pericare   Balance   Sitting Balance (Static) Fair +   Sitting Balance (Dynamic) Fair   Standing Balance (Static) Fair -   Standing Balance (Dynamic) Poor +   Weight Shift Sitting Fair   Weight Shift Standing Poor   Skilled Intervention Verbal Cuing;Tactile Cuing;Sequencing;Compensatory Strategies   Comments w/ FWW, cues to maintain TTWB LLE   Gait Analysis   Gait Level Of Assist Unable to Participate   Weight Bearing Status WBAT LUE, TTWB LLE   Comments limited 2/2 fatigue after BSC transfer and BM   Bed Mobility    Supine to Sit Standby Assist   Sit to Supine Standby Assist   Scooting Standby Assist   Rolling Standby Assist   Skilled Intervention Verbal Cuing   Comments HOB raised   Functional Mobility   Sit to Stand Minimal Assist   Bed, Chair, Wheelchair Transfer Minimal Assist   Toilet Transfers Minimal Assist   Transfer Method Stand Step   Mobility w/ FWW <> BSC   Skilled Intervention Verbal Cuing;Tactile Cuing;Sequencing;Compensatory Strategies   How much difficulty does the patient currently have...   Turning over in bed (including adjusting bedclothes, sheets and blankets)? 3   Sitting down on and standing up from a chair with arms (e.g., wheelchair, bedside commode, etc.) 1   Moving from lying on back to sitting on the side of the bed? 3   How much help from another person does the patient currently need...   Moving to and from a bed to a chair (including a wheelchair)? 3   Need to walk in a hospital room? 2   Climbing 3-5 steps with a railing? 2   6  clicks Mobility Score 14   Activity Tolerance   Sitting in Chair BSC 10 min   Sitting Edge of Bed 5 min   Standing 5 min total   Comments limted 2/2 fatigue   Patient / Family Goals    Patient / Family Goal #1 none stated   Short Term Goals    Short Term Goal # 1 pt will be able to complete supine<>sitting from flat bed with SPV in 6tx   Goal Outcome # 1 goal not met   Short Term Goal # 2 pt will be able to complete functional transfers with SPV and FWW in 6tx in order to improve independence   Goal Outcome # 2 Goal not met   Short Term Goal # 3 pt will be able to perform 100ft of WC mobility with SPV in 6tx in order to improve independence   Goal Outcome # 3 Goal not met   Short Term Goal # 4 Pt will ambulate 50ft with FWW, TTWB LLE, and SPV in 6 visits to initiate short distance ambulation   Goal Outcome # 4 Goal not met   Education Group   Education Provided Role of Physical Therapist;Weight Bearing Status   Role of Physical Therapist Patient Response Patient;Acceptance;Explanation;Demonstration;Verbal Demonstration;Action Demonstration   Weight Bearing Status Patient Response Patient;Acceptance;Explanation;Demonstration;Verbal Demonstration;Action Demonstration   Anticipated Discharge Equipment and Recommendations   DC Equipment Recommendations Unable to determine at this time   Discharge Recommendations Recommend post-acute placement for additional physical therapy services prior to discharge home   Interdisciplinary Plan of Care Collaboration   IDT Collaboration with  Nursing;Family / Caregiver  (trauma APRN)   Patient Position at End of Therapy In Bed;Call Light within Reach;Tray Table within Reach;Phone within Reach;Family / Friend in Room   Collaboration Comments Rn updated   Session Information   Date / Session Number  8/29-2 (2/4, 8/29) attempt 8/26

## 2022-08-29 NOTE — DISCHARGE PLANNING
Received Choice form at 0987  Agency/Facility Name: [facility] [[email or web address]   Referral sent per Choice form @ [phone]

## 2022-08-30 ENCOUNTER — PHARMACY VISIT (OUTPATIENT)
Dept: PHARMACY | Facility: MEDICAL CENTER | Age: 67
End: 2022-08-30
Payer: COMMERCIAL

## 2022-08-30 ENCOUNTER — APPOINTMENT (OUTPATIENT)
Dept: RADIOLOGY | Facility: MEDICAL CENTER | Age: 67
DRG: 958 | End: 2022-08-30
Attending: SURGERY
Payer: MEDICARE

## 2022-08-30 VITALS
HEART RATE: 77 BPM | HEIGHT: 69 IN | SYSTOLIC BLOOD PRESSURE: 92 MMHG | DIASTOLIC BLOOD PRESSURE: 50 MMHG | BODY MASS INDEX: 27.95 KG/M2 | RESPIRATION RATE: 19 BRPM | OXYGEN SATURATION: 96 % | TEMPERATURE: 97.5 F | WEIGHT: 188.71 LBS

## 2022-08-30 LAB
ALBUMIN SERPL BCP-MCNC: 2.3 G/DL (ref 3.2–4.9)
ALBUMIN/GLOB SERPL: 0.6 G/DL
ALP SERPL-CCNC: 126 U/L (ref 30–99)
ALT SERPL-CCNC: 40 U/L (ref 2–50)
ANION GAP SERPL CALC-SCNC: 10 MMOL/L (ref 7–16)
ANISOCYTOSIS BLD QL SMEAR: ABNORMAL
AST SERPL-CCNC: 51 U/L (ref 12–45)
BASOPHILS # BLD AUTO: 0 % (ref 0–1.8)
BASOPHILS # BLD: 0 K/UL (ref 0–0.12)
BILIRUB SERPL-MCNC: 0.8 MG/DL (ref 0.1–1.5)
BUN SERPL-MCNC: 16 MG/DL (ref 8–22)
CALCIUM SERPL-MCNC: 8 MG/DL (ref 8.5–10.5)
CHLORIDE SERPL-SCNC: 102 MMOL/L (ref 96–112)
CO2 SERPL-SCNC: 23 MMOL/L (ref 20–33)
CREAT SERPL-MCNC: 0.7 MG/DL (ref 0.5–1.4)
EOSINOPHIL # BLD AUTO: 0.12 K/UL (ref 0–0.51)
EOSINOPHIL NFR BLD: 0.9 % (ref 0–6.9)
ERYTHROCYTE [DISTWIDTH] IN BLOOD BY AUTOMATED COUNT: 59.8 FL (ref 35.9–50)
GFR SERPLBLD CREATININE-BSD FMLA CKD-EPI: 102 ML/MIN/1.73 M 2
GLOBULIN SER CALC-MCNC: 3.6 G/DL (ref 1.9–3.5)
GLUCOSE SERPL-MCNC: 140 MG/DL (ref 65–99)
HCT VFR BLD AUTO: 25.6 % (ref 42–52)
HGB BLD-MCNC: 8.3 G/DL (ref 14–18)
LYMPHOCYTES # BLD AUTO: 2.64 K/UL (ref 1–4.8)
LYMPHOCYTES NFR BLD: 19.1 % (ref 22–41)
MACROCYTES BLD QL SMEAR: ABNORMAL
MANUAL DIFF BLD: NORMAL
MCH RBC QN AUTO: 31.7 PG (ref 27–33)
MCHC RBC AUTO-ENTMCNC: 32.4 G/DL (ref 33.7–35.3)
MCV RBC AUTO: 97.7 FL (ref 81.4–97.8)
METAMYELOCYTES NFR BLD MANUAL: 1.7 %
MONOCYTES # BLD AUTO: 0.48 K/UL (ref 0–0.85)
MONOCYTES NFR BLD AUTO: 3.5 % (ref 0–13.4)
MORPHOLOGY BLD-IMP: NORMAL
MYELOCYTES NFR BLD MANUAL: 0.9 %
NEUTROPHILS # BLD AUTO: 10.2 K/UL (ref 1.82–7.42)
NEUTROPHILS NFR BLD: 73.9 % (ref 44–72)
NRBC # BLD AUTO: 0 K/UL
NRBC BLD-RTO: 0 /100 WBC
PLATELET # BLD AUTO: 486 K/UL (ref 164–446)
PLATELET BLD QL SMEAR: NORMAL
PMV BLD AUTO: 10.4 FL (ref 9–12.9)
POLYCHROMASIA BLD QL SMEAR: NORMAL
POTASSIUM SERPL-SCNC: 4.7 MMOL/L (ref 3.6–5.5)
PROT SERPL-MCNC: 5.9 G/DL (ref 6–8.2)
RBC # BLD AUTO: 2.62 M/UL (ref 4.7–6.1)
RBC BLD AUTO: PRESENT
SODIUM SERPL-SCNC: 135 MMOL/L (ref 135–145)
WBC # BLD AUTO: 13.8 K/UL (ref 4.8–10.8)

## 2022-08-30 PROCEDURE — A9270 NON-COVERED ITEM OR SERVICE: HCPCS | Performed by: NURSE PRACTITIONER

## 2022-08-30 PROCEDURE — 97530 THERAPEUTIC ACTIVITIES: CPT

## 2022-08-30 PROCEDURE — 71045 X-RAY EXAM CHEST 1 VIEW: CPT

## 2022-08-30 PROCEDURE — RXMED WILLOW AMBULATORY MEDICATION CHARGE: Performed by: NURSE PRACTITIONER

## 2022-08-30 PROCEDURE — 85025 COMPLETE CBC W/AUTO DIFF WBC: CPT

## 2022-08-30 PROCEDURE — 97116 GAIT TRAINING THERAPY: CPT

## 2022-08-30 PROCEDURE — A9270 NON-COVERED ITEM OR SERVICE: HCPCS | Performed by: SURGERY

## 2022-08-30 PROCEDURE — 700102 HCHG RX REV CODE 250 W/ 637 OVERRIDE(OP): Performed by: SURGERY

## 2022-08-30 PROCEDURE — 99239 HOSP IP/OBS DSCHRG MGMT >30: CPT | Performed by: NURSE PRACTITIONER

## 2022-08-30 PROCEDURE — 700102 HCHG RX REV CODE 250 W/ 637 OVERRIDE(OP): Performed by: NURSE PRACTITIONER

## 2022-08-30 PROCEDURE — 85007 BL SMEAR W/DIFF WBC COUNT: CPT

## 2022-08-30 PROCEDURE — 700101 HCHG RX REV CODE 250: Performed by: SPECIALIST

## 2022-08-30 PROCEDURE — 700111 HCHG RX REV CODE 636 W/ 250 OVERRIDE (IP): Performed by: SURGERY

## 2022-08-30 PROCEDURE — 97535 SELF CARE MNGMENT TRAINING: CPT

## 2022-08-30 PROCEDURE — 80053 COMPREHEN METABOLIC PANEL: CPT

## 2022-08-30 RX ORDER — METHOCARBAMOL 750 MG/1
750 TABLET, FILM COATED ORAL 4 TIMES DAILY
Qty: 56 TABLET | Refills: 0 | Status: SHIPPED | OUTPATIENT
Start: 2022-08-30 | End: 2022-09-13

## 2022-08-30 RX ORDER — CLOPIDOGREL BISULFATE 75 MG/1
75 TABLET ORAL DAILY
Status: DISCONTINUED | OUTPATIENT
Start: 2022-08-30 | End: 2022-08-30 | Stop reason: HOSPADM

## 2022-08-30 RX ORDER — LIDOCAINE 50 MG/G
1 PATCH TOPICAL EVERY 24 HOURS
Qty: 10 PATCH | Refills: 0 | Status: SHIPPED | OUTPATIENT
Start: 2022-08-30

## 2022-08-30 RX ORDER — CLOPIDOGREL BISULFATE 75 MG/1
150 TABLET ORAL DAILY
Qty: 60 TABLET | Refills: 0 | Status: SHIPPED | OUTPATIENT
Start: 2022-08-30 | End: 2022-08-30 | Stop reason: SDUPTHER

## 2022-08-30 RX ORDER — OXYCODONE HYDROCHLORIDE 10 MG/1
5-10 TABLET ORAL EVERY 4 HOURS PRN
Qty: 30 TABLET | Refills: 0 | Status: SHIPPED | OUTPATIENT
Start: 2022-08-30 | End: 2022-09-06

## 2022-08-30 RX ORDER — MORPHINE SULFATE 15 MG/1
15 TABLET, FILM COATED, EXTENDED RELEASE ORAL EVERY 12 HOURS
Qty: 14 TABLET | Refills: 0 | Status: SHIPPED | OUTPATIENT
Start: 2022-08-30 | End: 2022-09-06

## 2022-08-30 RX ORDER — GABAPENTIN 300 MG/1
CAPSULE ORAL
Qty: 42 CAPSULE | Refills: 0 | Status: SHIPPED | OUTPATIENT
Start: 2022-08-30 | End: 2022-09-20

## 2022-08-30 RX ORDER — AMOXICILLIN AND CLAVULANATE POTASSIUM 875; 125 MG/1; MG/1
1 TABLET, FILM COATED ORAL EVERY 12 HOURS
Qty: 6 TABLET | Refills: 0 | Status: SHIPPED | OUTPATIENT
Start: 2022-08-30 | End: 2022-09-02

## 2022-08-30 RX ORDER — ASPIRIN 81 MG/1
81 TABLET, CHEWABLE ORAL DAILY
Qty: 100 TABLET | COMMUNITY
Start: 2022-08-31

## 2022-08-30 RX ORDER — ACETAMINOPHEN 325 MG/1
650 TABLET ORAL EVERY 6 HOURS
Qty: 30 TABLET | Refills: 0 | COMMUNITY
Start: 2022-08-30

## 2022-08-30 RX ORDER — CLOPIDOGREL BISULFATE 75 MG/1
75 TABLET ORAL DAILY
Qty: 30 TABLET | Refills: 0 | Status: SHIPPED | OUTPATIENT
Start: 2022-08-30 | End: 2022-09-29

## 2022-08-30 RX ADMIN — METHOCARBAMOL 750 MG: 750 TABLET ORAL at 12:46

## 2022-08-30 RX ADMIN — CLOPIDOGREL BISULFATE 75 MG: 75 TABLET ORAL at 12:46

## 2022-08-30 RX ADMIN — OXYCODONE HYDROCHLORIDE 10 MG: 10 TABLET ORAL at 01:58

## 2022-08-30 RX ADMIN — METHOCARBAMOL 750 MG: 750 TABLET ORAL at 09:13

## 2022-08-30 RX ADMIN — ACETAMINOPHEN 650 MG: 325 TABLET ORAL at 00:12

## 2022-08-30 RX ADMIN — ASPIRIN 81 MG: 81 TABLET, CHEWABLE ORAL at 05:55

## 2022-08-30 RX ADMIN — ENOXAPARIN SODIUM 30 MG: 30 INJECTION SUBCUTANEOUS at 05:54

## 2022-08-30 RX ADMIN — LIDOCAINE 1 PATCH: 50 PATCH TOPICAL at 00:13

## 2022-08-30 RX ADMIN — ACETAMINOPHEN 650 MG: 325 TABLET ORAL at 11:41

## 2022-08-30 RX ADMIN — TAMSULOSIN HYDROCHLORIDE 0.4 MG: 0.4 CAPSULE ORAL at 09:13

## 2022-08-30 RX ADMIN — CELECOXIB 200 MG: 200 CAPSULE ORAL at 05:55

## 2022-08-30 RX ADMIN — MORPHINE SULFATE 15 MG: 15 TABLET, EXTENDED RELEASE ORAL at 05:54

## 2022-08-30 RX ADMIN — GABAPENTIN 300 MG: 300 CAPSULE ORAL at 11:41

## 2022-08-30 RX ADMIN — ACETAMINOPHEN 650 MG: 325 TABLET ORAL at 05:55

## 2022-08-30 RX ADMIN — AMOXICILLIN AND CLAVULANATE POTASSIUM 1 TABLET: 875; 125 TABLET, FILM COATED ORAL at 05:54

## 2022-08-30 RX ADMIN — OXYCODONE HYDROCHLORIDE 10 MG: 10 TABLET ORAL at 12:46

## 2022-08-30 RX ADMIN — OXYCODONE HYDROCHLORIDE 10 MG: 10 TABLET ORAL at 09:12

## 2022-08-30 RX ADMIN — GABAPENTIN 300 MG: 300 CAPSULE ORAL at 05:54

## 2022-08-30 ASSESSMENT — COGNITIVE AND FUNCTIONAL STATUS - GENERAL
STANDING UP FROM CHAIR USING ARMS: A LITTLE
SUGGESTED CMS G CODE MODIFIER MOBILITY: CK
DAILY ACTIVITIY SCORE: 22
SUGGESTED CMS G CODE MODIFIER DAILY ACTIVITY: CJ
CLIMB 3 TO 5 STEPS WITH RAILING: A LITTLE
MOVING FROM LYING ON BACK TO SITTING ON SIDE OF FLAT BED: A LITTLE
MOVING TO AND FROM BED TO CHAIR: A LITTLE
TURNING FROM BACK TO SIDE WHILE IN FLAT BAD: A LITTLE
DRESSING REGULAR LOWER BODY CLOTHING: A LITTLE
HELP NEEDED FOR BATHING: A LITTLE
WALKING IN HOSPITAL ROOM: A LITTLE
MOBILITY SCORE: 18

## 2022-08-30 ASSESSMENT — PAIN DESCRIPTION - PAIN TYPE
TYPE: ACUTE PAIN

## 2022-08-30 ASSESSMENT — GAIT ASSESSMENTS
DISTANCE (FEET): 5
DISTANCE (FEET): 15
GAIT LEVEL OF ASSIST: STANDBY ASSIST
DEVIATION: BRADYKINETIC
ASSISTIVE DEVICE: FRONT WHEEL WALKER

## 2022-08-30 NOTE — DISCHARGE PLANNING
Case Management Discharge Planning    Admission Date: 8/17/2022  GMLOS: 6  ALOS: 13    6-Clicks ADL Score: 17  6-Clicks Mobility Score: 14  PT and/or OT Eval ordered: Yes  Post-acute Referrals Ordered: Yes  Post-acute Choice Obtained: Yes  Has referral(s) been sent to post-acute provider:  Yes      Anticipated Discharge Dispo: Discharge Disposition: D/T to SNF with Medicare cert in anticipation of skilled care (03) vs Home with close OP follow up    DME Needed: No    Action(s) Taken: Updated Provider/Nurse on Discharge Plan  Pt was declined at [facility] due to non contracted Insurance . Referral  was sent to [facility], referral is pending.     Per PT/OT notes today, Pt wants to go home.   Discussed case with KARMA Cardenas and requested that Ronald order Pt's FWW with Traction.         Escalations Completed: None    Medically Clear: Yes    Next Steps:   This RN CM to continue to assist Pt with discharge as needed    Barriers to Discharge:  None      Is the patient up for discharge tomorrow: No

## 2022-08-30 NOTE — CARE PLAN
The patient is Stable - Low risk of patient condition declining or worsening    Shift Goals  Clinical Goals: Pain management; Pulse checks  Patient Goals: Rest; Shower  Family Goals: N/A    Progress made toward(s) clinical / shift goals:  Patient medicated per MAR. Non-pharmacologic interventions discussed and utilized. Patient at edge of bed for ADLs. Patient educated on on IS use and deep breathing to begin weaning off O2.
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
## 2022-08-30 NOTE — PROGRESS NOTES
Received report from previous shift RN at 1900.  Assessment complete.  A&O x 4. Patient calls appropriately.  Patient ambulates with x1 assist and FWW. LLE toe-touch weight bearing.  Patient has 5/10 pain. Pain managed with prescribed medications per MAR and rest.  Denies N&V. Tolerating regular diet.  Surgical incisions to bilateral groin. L leg with ace wrap dressing.  + void, + flatus, + BM on 8/29.  Patient denies SOB on 1L O2 via NC.  Patient pleasant and cooperative throughout assessment.    Reviewed plan of care with patient, pt verbalizes understanding. Call light and personal belongings with in reach. Hourly rounding in place. All needs met at this time.

## 2022-08-30 NOTE — PROGRESS NOTES
Arrive to dc lounge via wheelchair. A/O, dc instructions reviewed w/ pt, verbalized understanding. Meds to bed delivered. DC home w/ wife in stable condition.

## 2022-08-30 NOTE — DISCHARGE INSTRUCTIONS
Angiogram, Angioplasty, or Stent Placement  Care After  One of the following procedures was done today.  ANGIOGRAM:  A catheter was placed through the blood vessel in your groin, contrast was injected into the vessels, and pictures were taken.  ANGIOPLASTY:  A catheter was placed through the blood vessel in your groin and directed to an area of blocked blood flow. A balloon, and possibly a metal stent were used to open the blockage. If no other blockages are present below this area, your symptoms should improve. If blockages are present below this area, surgery may still be necessary.  STENT:  A catheter was placed in your groin through which a metal mesh tube was placed in a narrowed part of the artery to facilitate blood flow.  You were given intravenous sedation. These medications are rapidly cleared from your bloodstream. You may feel some discomfort at the insertion site after the local anesthetic wears off. This discomfort should gradually improve over the next several days.  Only take over-the-counter or prescription medicines for pain, discomfort, or fever as directed by your caregiver.  Complications are very uncommon after this procedure. Go to the nearest emergency department if you develop any of the following symptoms:  Worsening pain.  Bleeding.  Swelling at the puncture site.  Lightheadedness.  Dizziness or fainting.  Fever or chills.  If oozing, bleeding, or a lump appears at the puncture site, apply firm pressure directly to the site steadily for 15 minutes and go to the emergency department.  Keep the skin around the insertion site dry. You may take showers after 24 hours. If the area does get wet, dry the skin completely. Avoid baths until the skin puncture site heals, usually 5 to 7 days.  Development of redness, increased soreness, or swelling may be signs of a skin infection. Contact your physician.  Rest for the remainder of the day and avoid any heavy lifting (more than 10 pounds or 4.5 kg).  Do not operate heavy machinery, drive, or make legal decisions for the first 24 hours after the procedure. Have a responsible person drive you home.  You may resume your usual diet after the procedure. Avoid alcoholic beverages for 24 hours after the procedure.  Document Released: 12/18/2006 Document Revised: 03/11/2013 Document Reviewed: 10/17/2007  [employer]® Patient Information ©2014 [employer].    Tibial and Fibular Fractures, Adult    Tibial and fibular fractures are breaks in both of the lower leg bones (tibia and fibula). The tibia is the larger of these two bones, and is also called the shin bone. The fibula is the smaller of the two bones and is on the outer side of the leg.  When tibiofibular fractures happen, the bones may:  Break, but stay close to their normal position (stable or non displaced fracture).  Break and move out of their normal position (unstable or displaced fracture).  Break into several small pieces (comminuted fracture).  Break through the skin (compound or open fracture).  What are the causes?  This condition is caused by injuries, such as:  Falls from a height or falls while cycling.  Sports contact injuries, like collisions in football or soccer.  Severe, forceful twisting of your leg, such as falls while skiing.  Car or motorcycle accidents.  What increases the risk?  You are more likely to develop this condition if:  You participate in sports, especially contact sports or sports that may involve impact or twisting, like football or skiing.  You smoke.  What are the signs or symptoms?  Symptoms of this condition include:  Pain, swelling, and bruising in the lower leg, ankle, or knee.  Difficulty walking or putting weight on your injured leg.  Change in the shape of your leg at the site of the injury.  Pain that gets worse with moving or standing and gets better with rest.  How is this diagnosed?  This condition is diagnosed with a physical exam and X-rays. In some cases, a CT scan  may be done to help diagnose certain types of fractures.  How is this treated?  Treatment for this condition depends on the type and severity of your fractures.  If your bones did not move out of place and your leg is still stable, or if you are unable to have surgery, you may be treated with a cast, brace or walking boot. This keeps the bones in place while they heal (immobilization).  If your bones are out of place or if your leg is unstable, you may need surgery. Surgery is common for tibial and fibular fractures. During surgery, bones are moved back to their normal place, and a malvin, plate, or screws are used to hold the bones in place. After surgery, the leg is put in a splint or cast.  Treatment may also include:  Medicine, ice, and elevation of the leg to help relieve pain and inflammation.  Using crutches or a walker while you heal.  Exercises to strengthen leg and ankle muscles and restore motion (physical therapy).  Follow these instructions at home:  If you have a splint, brace, or walking boot:  Wear the splint, brace, or boot as told by your health care provider. Remove it only as told by your health care provider. Some types of splints can only be removed by your health care provider.  Loosen the splint, brace, or boot if your toes tingle, become numb, or turn cold and blue.  Keep the splint, brace, or boot clean and dry.  If you have a cast:  Do not stick anything inside the cast to scratch your skin. Doing that increases your risk of infection.  Check the skin around the cast every day. Tell your health care provider about any concerns.  You may put lotion on dry skin around the edges of the cast. Do not put lotion on the skin underneath the cast.  Keep the cast clean and dry.  Bathing  Do not take baths, swim, or use a hot tub until your health care provider approves. Ask your health care provider if you can take showers. You may only be allowed to take sponge baths.  If you have a cast, splint,  brace, or boot that is not waterproof:  Do not let it get wet.  Cover it with a watertight covering when you take a bath or a shower.  Managing pain, stiffness, and swelling    If directed, put ice on the injured area.  If you have a removable splint, brace, or boot, remove it as told by your health care provider.  Put ice in a plastic bag.  Place a towel between your skin and the bag or between your splint or cast and the bag.  Leave the ice on for 20 minutes, 2-3 times a day.  Move your toes often to avoid stiffness and to lessen swelling.  Raise (elevate) the injured area above the level of your heart while you are sitting or lying down.  Driving  Do not drive or use heavy machinery while taking prescription pain medicine.  Ask your health care provider when it is safe to drive if you have a cast, splint, brace, or boot.  Activity  Return to your normal activities as told by your health care provider. Ask your health care provider what activities are safe for you.  If physical therapy was prescribed, do exercises as told by your health care provider.  Safety  Do not use the injured limb to support your body weight until your health care provider says that you can. Use crutches or a walker as told by your health care provider.  General instructions    Do not put pressure on any part of the cast or splint until it is fully hardened. This may take several hours.  Do not use any products that contain nicotine or tobacco, such as cigarettes and e-cigarettes. These can delay bone healing. If you need help quitting, ask your health care provider.  Take over-the-counter and prescription medicines only as told by your health care provider.  Keep all follow-up visits as told by your health care provider. This is important.  Contact a health care provider if:  You have pain that gets worse or does not get better with rest or medicine.  You have more swelling or redness in your foot.  Get help right away if:  Your skin or  nails below your injury:  Turn blue or gray.  Feel cold.  Become numb.  Become less sensitive to touch.  You develop new or severe pain in your leg or foot.  You have tingling, burning, and tightness in your lower leg.  Summary  Tibial and fibular fractures are breaks in both of the lower leg bones (tibia and fibula).  If your bones are out of place or if your leg is unstable, you may need surgery. Surgery is common for tibial and fibular fractures.  If directed, put ice on the injured area for 20 minutes, 2-3 times a day.  Pain medicine and elevating your injured leg will help control pain and reduce swelling. Follow directions as told by your health care provider.  This information is not intended to replace advice given to you by your health care provider. Make sure you discuss any questions you have with your health care provider.  Document Released: 09/09/2003 Document Revised: 11/30/2018 Document Reviewed: 03/28/2018  [employer] Patient Education © 2020 [employer]        - Call or seek medical attention for questions or concerns  - Follow up with the [facility] RET[facility]: As needed  - Follow up with Dr. Lawrence in 1-2 weeks time for vascular follow up  - Follow up with [facility] in 1-2 weeks  - Follow up with primary care provider within one weeks time  - Resume regular diet  - May take over the counter acetaminophen or ibuprofen as needed for pain  - Continue daily over the counter stool softener while on narcotics  - No operation of machinery or motorized vehicles while under the influence of narcotics  - No alcohol, marijuana or illicit drug use while under the influence of narcotics  - In the event of a narcotic overdose naloxone (Narcan) is available without a prescription from any [facility] or [facility]  - No swimming, hot tubs, baths or wound submersion until cleared by outpatient provider. May shower  - No contact sports, strenuous activities, or heavy lifting  until cleared by outpatient provider  - If respiratory decompensation, persistent or worsening pain, change in condition or worsening condition, or signs or symptoms of infection occur seek medical attention  - Maintain weight bearing restriction as ordered by orthopedics.     Discharge Instructions    Discharged to home by car with relative. Discharged via wheelchair, hospital escort: Yes.  Special equipment needed: Not Applicable    Be sure to schedule a follow-up appointment with your primary care doctor or any specialists as instructed.     Discharge Plan:   Diet Plan: Discussed  Activity Level: Discussed  Confirmed Follow up Appointment: Patient to Call and Schedule Appointment  Confirmed Symptoms Management: Discussed  Medication Reconciliation Updated: Yes    I understand that a diet low in cholesterol, fat, and sodium is recommended for good health. Unless I have been given specific instructions below for another diet, I accept this instruction as my diet prescription.   Other diet:     Special Instructions: None    -Is this patient being discharged with medication to prevent blood clots?  No    Is patient discharged on Warfarin / Coumadin?   No   Incision Care, Adult  An incision is a surgical cut that is made through your skin. Most incisions are closed after surgery. Your incision may be closed with stitches (sutures), staples, skin glue, or adhesive strips. You may need to return to your health care provider to have sutures or staples removed. This may occur several days to several weeks after your surgery. The incision needs to be cared for properly to prevent infection.  How to care for your incision  Incision care    Follow instructions from your health care provider about how to take care of your incision. Make sure you:  Wash your hands with soap and water before you change the bandage (dressing). If soap and water are not available, use hand .  Change your dressing as told by your health  care provider.  Leave sutures, skin glue, or adhesive strips in place. These skin closures may need to stay in place for 2 weeks or longer. If adhesive strip edges start to loosen and curl up, you may trim the loose edges. Do not remove adhesive strips completely unless your health care provider tells you to do that.  Check your incision area every day for signs of infection. Check for:  More redness, swelling, or pain.  More fluid or blood.  Warmth.  Pus or a bad smell.  Ask your health care provider how to clean the incision. This may include:  Using mild soap and water.  Using a clean towel to pat the incision dry after cleaning it.  Applying a cream or ointment. Do this only as told by your health care provider.  Covering the incision with a clean dressing.  Ask your health care provider when you can leave the incision uncovered.  Do not take baths, swim, or use a hot tub until your health care provider approves. Ask your health care provider if you can take showers. You may only be allowed to take sponge baths for bathing.  Medicines  If you were prescribed an antibiotic medicine, cream, or ointment, take or apply the antibiotic as told by your health care provider. Do not stop taking or applying the antibiotic even if your condition improves.  Take over-the-counter and prescription medicines only as told by your health care provider.  General instructions  Limit movement around your incision to improve healing.  Avoid straining, lifting, or exercise for the first month, or for as long as told by your health care provider.  Follow instructions from your health care provider about returning to your normal activities.  Ask your health care provider what activities are safe.  Protect your incision from the sun when you are outside for the first 6 months, or for as long as told by your health care provider. Apply sunscreen around the scar or cover it up.  Keep all follow-up visits as told by your health care  provider. This is important.  Contact a health care provider if:  Your have more redness, swelling, or pain around the incision.  You have more fluid or blood coming from the incision.  Your incision feels warm to the touch.  You have pus or a bad smell coming from the incision.  You have a fever or shaking chills.  You are nauseous or you vomit.  You are dizzy.  Your sutures or staples come undone.  Get help right away if:  You have a red streak coming from your incision.  Your incision bleeds through the dressing and the bleeding does not stop with gentle pressure.  The edges of your incision open up and separate.  You have severe pain.  You have a rash.  You are confused.  You faint.  You have trouble breathing and a fast heartbeat.  This information is not intended to replace advice given to you by your health care provider. Make sure you discuss any questions you have with your health care provider.  Document Released: 07/07/2006 Document Revised: 12/20/2019 Document Reviewed: 07/05/2017  [employer] Patient Education © 2020 [employer]

## 2022-08-30 NOTE — PROGRESS NOTES
Assumed care of patient at 0645. Bedside report received. Assessment complete.  AA&Ox4. Denies CP/SOB.  Reporting 8/10 pain. Medicated per MAR. Educated patient regarding pharmacologic and non pharmacologic modalities for pain management.  Skin per flowsheets  Tolerating regular diet. Denies N/V.  + void. Last BM 8/29  Pt ambulates LLE TTWB, up to chair w Minimal assist w FWW  All needs met at this time. Call light within reach. Pt calls appropriately. Bed low and locked, non skid socks in place. Hourly rounding in place.
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
## 2022-08-30 NOTE — CARE PLAN
Problem: Pain - Standard  Goal: Alleviation of pain or a reduction in pain to the patient’s comfort goal  Outcome: Progressing     Problem: Knowledge Deficit - Standard  Goal: Patient and family/care givers will demonstrate understanding of plan of care, disease process/condition, diagnostic tests and medications  Outcome: Progressing     Problem: Fall Risk  Goal: Patient will remain free from falls  Outcome: Progressing   The patient is Stable - Low risk of patient condition declining or worsening    Shift Goals  Clinical Goals: Ambulation  Patient Goals: Discharge  Family Goals: N/A    Progress made toward(s) clinical / shift goals:  Patient Ambulated x2 w PT/ OT this shift, patient to DC lounge     Patient is not progressing towards the following goals:

## 2022-08-30 NOTE — PROGRESS NOTES
"   Orthopaedic Progress Note    Interval changes:  Patient doing well     LLE dressings CDI    ROS - Patient denies any new issues.  Pain well controlled.    BP (!) 92/50   Pulse 77   Temp 36.4 °C (97.5 °F) (Temporal)   Resp 19   Ht 1.753 m (5' 9.02\")   Wt 85.6 kg (188 lb 11.4 oz)   SpO2 97%       Patient seen and examined  No acute distress  Breathing non labored  RRR  LLE proximal I&D dressings CDI. Distal LLE dressings CDI, DVNI, moves all toes, cap refill <2 sec.     Recent Labs     08/28/22  0531 08/29/22  0235 08/30/22  0350   WBC 12.5* 13.9* 13.8*   RBC 2.49* 2.50* 2.62*   HEMOGLOBIN 7.7* 7.8* 8.3*   HEMATOCRIT 24.0* 24.4* 25.6*   MCV 96.4 97.6 97.7   MCH 30.9 31.2 31.7   MCHC 32.1* 32.0* 32.4*   RDW 58.4* 60.6* 59.8*   PLATELETCT 382 450* 486*   MPV 9.9 9.9 10.4       Active Hospital Problems    Diagnosis     Bandemia [D72.825]      Priority: High    Fracture of ribs, seven, left, closed, initial encounter [S22.42XA]      Priority: High    Discharge planning issues [Z02.9]      Priority: Medium    Blood loss anemia [D50.0]      Priority: Medium    Type III open fracture of left tibia and fibula [S82.202C, S82.402C]      Priority: Medium    Penetrating injury of left lower extremity [S81.832A]      Priority: Medium    Traumatic tear of thoracic aorta [S25.01XA]      Priority: Medium    Closed fracture of posterior wall of left acetabulum (HCC) [S32.422A]      Priority: Medium    Trauma [T14.90XA]      Priority: Low    No contraindication to deep vein thrombosis (DVT) prophylaxis [Z78.9]      Priority: Low    Benign prostatic hyperplasia without lower urinary tract symptoms [N40.0]      Priority: Low       Assessment/Plan:  Patient doing well   POD#10 S/P:  1. Open treatment of left tibia shaft fracture with insertion of intramedullary implant  2.  Open external fixation left distal tibia intra-articular fracture with fixation of fibula  3.  Removal external fixator under anesthesia  POD#12  S/P   1.  " Irrigation debridement open fracture   2.  External fixation left leg   3.  Exploration left thigh penetrating wound  Wt bearing status - NWB LLE  Wound care/Drains - Dressings to be left in place  Future Procedures - non planned   Sutures/Staples out- 14 days post operatively  PT/OT-initiated  Antibiotics: completed  DVT Prophylaxis- TEDS/SCDs/Foot pumps  Levy-none  Case Coordination for Discharge Planning - Disposition pending final therapy recs
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
## 2022-08-30 NOTE — THERAPY
"Occupational Therapy  Daily Treatment     Patient Name: Ezekiel Sanz  Age:  66 y.o., Sex:  male  Medical Record #: [id number]  Today's Date: 8/30/2022     Precautions: Fall Risk, Weight Bearing As Tolerated Left Upper Extremity, Toe Touch Weight Bearing Right Lower Extremity  Comments: aortic injury. WBAT LUE per trauma APRN    Assessment    Pt seen for OT tx to include: bed mobility, transfer training, LB dressing, simulated toileting, seated grooming. See grid below for details. Spouse present and supportive. Pt able to mobilize to/from bathroom using FWW with good adherence to TTWB LLE. Educated pt and spouse on wrapping LLE (or using over-the-counter \"cast cover\") and having supv with shower transfer. Pt is completing BADL and transfers with no more than supv in this setting. DC acute OT due to goals met.     Plan    Discharge secondary to goals met.    DC Equipment Recommendations: None  Discharge Recommendations: Anticipate that the patient will have no further occupational therapy needs after discharge from the hospital    Subjective    \"I want to go home\"     Objective       08/30/22 0913   Other Treatments   Other Treatments Provided Education with pt and spouse on modified ADL (i.e.: seated grooming) to ensure adherence to TTWB   Balance   Sitting Balance (Static) Good   Sitting Balance (Dynamic) Fair +   Standing Balance (Static) Fair   Standing Balance (Dynamic) Fair   Weight Shift Sitting Fair   Weight Shift Standing Absent  (TTWB)   Comments FWW for standing   Bed Mobility    Supine to Sit Supervised  (flat bed, no rail)   Scooting Supervised  (seated)   Activities of Daily Living   Grooming Modified Independent;Seated  (oral care)   Lower Body Dressing Supervision  (doff/don R sock; shorts already donned (reports he completed without assist))   Toileting   (declined need, completed toilet transfer and simulated hygiene)   Functional Mobility   Sit to Stand Supervised   Bed, Chair, Wheelchair Transfer " Supervised   Toilet Transfers Standby Assist   Transfer Method Stand Step  (FWW)   Mobility Supine > EOB, short gait and transfers with FWW   Short Term Goals   Short Term Goal # 1 Pt will demo functional txfs CGA   Goal Outcome # 1 Goal met   Short Term Goal # 2 Pt will demo LB dressing using AD as needed SPV   Goal Outcome # 2 Goal met   Short Term Goal # 3 Pt will demo toileting hygiene SPV   Goal Outcome # 3 Goal met  (simulated hygiene)

## 2022-08-31 LAB
BACTERIA BLD CULT: NORMAL
BACTERIA BLD CULT: NORMAL
SIGNIFICANT IND 70042: NORMAL
SIGNIFICANT IND 70042: NORMAL
SITE SITE: NORMAL
SITE SITE: NORMAL
SOURCE SOURCE: NORMAL
SOURCE SOURCE: NORMAL

## 2022-10-27 ENCOUNTER — OFFICE VISIT (OUTPATIENT)
Dept: VASCULAR SURGERY | Facility: MEDICAL CENTER | Age: 67
End: 2022-10-27
Payer: MEDICARE

## 2022-10-27 VITALS
HEART RATE: 80 BPM | DIASTOLIC BLOOD PRESSURE: 78 MMHG | TEMPERATURE: 97.7 F | HEIGHT: 69 IN | BODY MASS INDEX: 28.58 KG/M2 | SYSTOLIC BLOOD PRESSURE: 126 MMHG | WEIGHT: 193 LBS | OXYGEN SATURATION: 97 %

## 2022-10-27 DIAGNOSIS — S25.01XD: ICD-10-CM

## 2022-10-27 PROCEDURE — 99024 POSTOP FOLLOW-UP VISIT: CPT | Performed by: SURGERY

## 2022-10-27 ASSESSMENT — FIBROSIS 4 INDEX: FIB4 SCORE: 1.1
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
## 2022-11-08 ENCOUNTER — PATIENT MESSAGE (OUTPATIENT)
Dept: HEALTH INFORMATION MANAGEMENT | Facility: OTHER | Age: 67
End: 2022-11-08

## 2022-11-22 ENCOUNTER — TELEPHONE (OUTPATIENT)
Dept: VASCULAR SURGERY | Facility: MEDICAL CENTER | Age: 67
End: 2022-11-22
Payer: MEDICARE

## 2022-11-28 ENCOUNTER — TELEPHONE (OUTPATIENT)
Dept: VASCULAR SURGERY | Facility: MEDICAL CENTER | Age: 67
End: 2022-11-28
Payer: MEDICARE

## 2022-11-28 NOTE — TELEPHONE ENCOUNTER
Patient called vascular office stating over the weekend he passed out a few times. I sent a message to Dr. Lawrence about the patient and he said the patient should go to the ER. I called Ezekiel back to let him know that Dr. Lawrence said to go to the ER. He understood.

## 2022-11-29 ENCOUNTER — APPOINTMENT (OUTPATIENT)
Dept: VASCULAR SURGERY | Facility: MEDICAL CENTER | Age: 67
End: 2022-11-29
Payer: MEDICARE

## 2022-12-13 ENCOUNTER — APPOINTMENT (OUTPATIENT)
Dept: VASCULAR SURGERY | Facility: MEDICAL CENTER | Age: 67
End: 2022-12-13
Payer: MEDICARE

## 2023-06-22 DIAGNOSIS — S25.01XD: ICD-10-CM

## 2023-06-29 ENCOUNTER — TELEPHONE (OUTPATIENT)
Dept: VASCULAR LAB | Facility: MEDICAL CENTER | Age: 68
End: 2023-06-29
Payer: MEDICARE

## 2023-06-29 NOTE — TELEPHONE ENCOUNTER
Received VM from pt asking about orders signed by CARA Chamberlain. They didn't specify what they needed    Forwarded to Vascular MA

## 2023-07-31 ENCOUNTER — TELEPHONE (OUTPATIENT)
Dept: VASCULAR LAB | Facility: MEDICAL CENTER | Age: 68
End: 2023-07-31
Payer: MEDICARE

## 2023-07-31 NOTE — TELEPHONE ENCOUNTER
Left voicemail for pt to remind them that they are due for their surveillance vascular imaging.  Scheduling numbers provided.    Charla Neal R.N.   [facility]

## 2023-08-09 DIAGNOSIS — S25.01XD: ICD-10-CM

## 2023-08-09 NOTE — PROGRESS NOTES
Reviewed CTA complete order with MB, for [facility] to complete this they want 2 separate orders. CTA chest and CTA Abdo/pelvis. 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

## 2023-08-17 ENCOUNTER — TELEPHONE (OUTPATIENT)
Dept: VASCULAR LAB | Facility: MEDICAL CENTER | Age: 68
End: 2023-08-17
Payer: MEDICARE

## 2023-08-17 NOTE — TELEPHONE ENCOUNTER
2nd VM left for pt regarding scheduling imaging. Left voicemail for pt to remind them that they are due for their surveillance vascular imaging.  Scheduling numbers provided.  Requesting call back if they would like orders sent elsewhere.     Charla Neal R.N.   [facility]

## 2023-08-22 ENCOUNTER — DOCUMENTATION (OUTPATIENT)
Dept: VASCULAR LAB | Facility: MEDICAL CENTER | Age: 68
End: 2023-08-22
Payer: MEDICARE

## 2023-08-22 NOTE — PROGRESS NOTES
Received message that [facility] called.  Insurance did not auth CTA.       Requesting Peer to Peer with [employer].   # [phone]   Case# [id number]       Peer to Peer scheduled for 8/23/2023 with Russell to review.    uLda HANNAH  [facility]

## 2023-08-23 ENCOUNTER — DOCUMENTATION (OUTPATIENT)
Dept: VASCULAR LAB | Facility: MEDICAL CENTER | Age: 68
End: 2023-08-23
Payer: MEDICARE

## 2023-08-23 DIAGNOSIS — S25.01XD: ICD-10-CM

## 2023-08-23 NOTE — Clinical Note
Insurance denied previous orders for cta as they never received clinical info, history/notes for indication with order from [facility] (I assume).  Looks like order may have been misordered at [facility] when we transferred/faxed info over to them as well?   Peer to peer completed today, I just decided to cancel all previous orders, and reordered new CTA chest, and CTA abd/pelvis.  Insurance will be expecting clinical info with this order/approval request. Please make sure initial notes, jaquish last office note sent with order to [facility]. (We have never seen him in our office). May want to clarify with Jeanette if there is any other additional info which we should send?  Or if bloch wants to make a note for need of cta abd/pelvis and chest?    Thanks Luda
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
## 2023-09-26 ENCOUNTER — TELEPHONE (OUTPATIENT)
Dept: VASCULAR LAB | Facility: MEDICAL CENTER | Age: 68
End: 2023-09-26
Payer: MEDICARE

## 2023-09-26 NOTE — TELEPHONE ENCOUNTER
Per KARMA Berg : These orders for the CTA were sent to [facility], can you please remind him to schedule     Vm left       Flynn Agarwal  [facility]   P#[phone]  Fax#[phone]

## 2024-06-25 ENCOUNTER — TELEPHONE (OUTPATIENT)
Dept: VASCULAR SURGERY | Facility: MEDICAL CENTER | Age: 69
End: 2024-06-25
Payer: MEDICARE
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
## 2025-01-28 ENCOUNTER — APPOINTMENT (OUTPATIENT)
Dept: VASCULAR SURGERY | Facility: MEDICAL CENTER | Age: 70
End: 2025-01-28
Payer: MEDICARE

## (undated) DEVICE — VESSELOOP MAXI BLUE STERILE- SURG-I-LOOP (10EA/BX)

## (undated) DEVICE — DRAPE SURGICAL U 77X120 - (10/CA)

## (undated) DEVICE — SLEEVE VASO CALF MED - (10PR/CA)

## (undated) DEVICE — SLEEVE, VASO, THIGH, MED

## (undated) DEVICE — TUBING CLEARLINK DUO-VENT - C-FLO (48EA/CA)

## (undated) DEVICE — SODIUM CHL IRRIGATION 0.9% 1000ML (12EA/CA)

## (undated) DEVICE — SUTURE ETHILON 2-0 FSLX 30 (36PK/BX)"

## (undated) DEVICE — SYRINGE 30 ML LL (56/BX)

## (undated) DEVICE — SENSOR OXIMETER ADULT SPO2 RD SET (20EA/BX)

## (undated) DEVICE — SUTURE 3-0 VICRYL PLUS SH - 8X 18 INCH (12/BX)

## (undated) DEVICE — DRESSING TRANSPARENT FILM TEGADERM 4 X 4.75" (50EA/BX)"

## (undated) DEVICE — GVL 4 STAT DISPOSABLE - (10/BX)

## (undated) DEVICE — Device

## (undated) DEVICE — SOD. CHL. INJ. 0.9% 250 ML - (36/CA 50CA/PF)

## (undated) DEVICE — GLOVE BIOGEL PI INDICATOR SZ 7.0 SURGICAL PF LF - (50/BX 4BX/CA)

## (undated) DEVICE — BALLOON 8.0X60 75 CM MUSTANG

## (undated) DEVICE — DECANTER FLD BLS - (50/CA)

## (undated) DEVICE — BLANKET UNDERBODY ADULT - (10/CA)

## (undated) DEVICE — DRAPE LOWER EXTREMETY - (6/CA)

## (undated) DEVICE — GUIDEWIRES STARTER (PTFE COATED) ROSEN 0.035 260CM 4 1.5 MM J

## (undated) DEVICE — GLOVE BIOGEL SZ 6.5 SURGICAL PF LTX (50PR/BX 4BX/CA)

## (undated) DEVICE — DERMABOND ADVANCED - (12EA/BX)

## (undated) DEVICE — WIRE GUIDE LUNDERQUIST EXTRA STIFF DC

## (undated) DEVICE — EXFX SN CLAMP QUICK 10.5 B/B - (2TX8=16)

## (undated) DEVICE — CHLORAPREP 26 ML APPLICATOR - ORANGE TINT(25/CA)

## (undated) DEVICE — GLOVE BIOGEL SZ 8 SURGICAL PF LTX - (50PR/BX 4BX/CA)

## (undated) DEVICE — SUTURE GENERAL

## (undated) DEVICE — DRAPE IOBAN LARGE 2 INCISE FILM (5EA/CA)

## (undated) DEVICE — PAD PREP 24 X 48 CUFFED - (100/CA)

## (undated) DEVICE — GOWN SURGEONS LARGE - (32/CA)

## (undated) DEVICE — PAD LAP STERILE 18 X 18 - (5/PK 40PK/CA)

## (undated) DEVICE — GLOVE SZ 6 BIOGEL PI MICRO - PF LF (50PR/BX 4BX/CA)

## (undated) DEVICE — WIPE INSTRUMENT MICROWIPE (20EA/SP)

## (undated) DEVICE — LACTATED RINGERS INJ 1000 ML - (14EA/CA 60CA/PF)

## (undated) DEVICE — TRANSDUCER ADULT DISP. SINGLE BONDED STERILE - (20EA/CA)

## (undated) DEVICE — SUTURE 2-0 ETHILON FS - (36/BX) 18 INCH

## (undated) DEVICE — SYRINGE NON SAFETY 10 CC 20 GA X 1-1/2 IN (100/BX 4BX/CA)

## (undated) DEVICE — DRAPE IOBAN II INCISE 23X17 - (10EA/BX 4BX/CA)

## (undated) DEVICE — SPONGE XRAY 8X4 STERL. 12PL - (10EA/TY 80TY/CA)

## (undated) DEVICE — SYRINGE DISP. 12 CC LL - (100/BX)

## (undated) DEVICE — DRAPE 36X28IN RAD CARM BND BG - (25/CA) O

## (undated) DEVICE — CLIP SM INTNL HRZN TI ESCP LGT - (24EA/PK 25PK/BX)

## (undated) DEVICE — BLANKET WARMING LOWER BODY (10EA/CA)

## (undated) DEVICE — KIT SURGIFLO W/OUT THROMBIN - (6EA/CA)

## (undated) DEVICE — SUTURE CV

## (undated) DEVICE — GELAQUASONIC 100 ULTRASOUND - 48/BX 20GM STERILE FOIL POUCH

## (undated) DEVICE — SUTURE 3-0 SILK 12 X 18 IN - (36/BX)

## (undated) DEVICE — ELECTRODE DUAL RETURN W/ CORD - (50/PK)

## (undated) DEVICE — STOCKINET TUBULAR 6IN STERILE - 6 X 48YDS (25/CA)

## (undated) DEVICE — SOD. CHL. INJ. 0.9% 1000 ML - (14EA/CA 60CA/PF)

## (undated) DEVICE — EXFX SN CLAMP 4-HOLE PIN - (2TX4=8)

## (undated) DEVICE — ROD GUIDE BALL TIP 3.0MM X 1000MM

## (undated) DEVICE — GLOVE BIOGEL PI INDICATOR SZ 8.0 SURGICAL PF LF -(50/BX 4BX/CA)

## (undated) DEVICE — VESSELOOP MINI BLUE STERILE - SURG-I-LOOP (10EA/BX)

## (undated) DEVICE — SUTURE 4-0 SILK 12 X 18 INCH - (36/BX)

## (undated) DEVICE — DRAPE MAGNETIC (INSTRA-MAG) - (30/CA)

## (undated) DEVICE — DRAPE LARGE 3 QUARTER - (20/CA)

## (undated) DEVICE — GUIDEWIRE 25CM 0.35 260CM ANGLED GLIDEWIRE ADVANTAGE (1/BX)

## (undated) DEVICE — SUTURE 0 VICRYL PLUS CT-1 - 8 X 18 INCH (12/BX)

## (undated) DEVICE — GLOVE BIOGEL PI INDICATOR SZ 6.0 SURGICAL PF LF -(200PR/CA)

## (undated) DEVICE — CLIP MED LG INTNL HRZN TI ESCP - (20/BX)

## (undated) DEVICE — BANDAGE ELASTIC STERILE VELCRO 6 X 5 YDS (25EA/CA)

## (undated) DEVICE — PACK MAJOR BASIN - (2EA/CA)

## (undated) DEVICE — SODIUM CHL. INJ. 0.9% 500ML (24EA/CA 50CA/PF)

## (undated) DEVICE — GUIDE ROD R-T BALL TIP 3.0

## (undated) DEVICE — BANDAGE ELASTIC STERILE MATRIX 6 X 10 (20EA/CA)

## (undated) DEVICE — TIP INTPLS HFLO ML ORFC BTRY - (12/CS)  FOR SURGILAV

## (undated) DEVICE — GLOVE BIOGEL SZ 7.5 SURGICAL PF LTX - (50PR/BX 4BX/CA)

## (undated) DEVICE — GLOVE BIOGEL INDICATOR SZ 8 SURGICAL PF LTX - (50/BX 4BX/CA)

## (undated) DEVICE — TUBE E-T HI-LO CUFF 8.5MM (10EA/PK)

## (undated) DEVICE — STAPLER SKIN DISP - (6/BX 10BX/CA) VISISTAT

## (undated) DEVICE — GOWN WARMING STANDARD FLEX - (30/CA)

## (undated) DEVICE — TOWEL STOP TIMEOUT SAFETY FLAG (40EA/CA)

## (undated) DEVICE — SHEATH RO 5F 10CM (10EA/BX)

## (undated) DEVICE — TUBING PRSS MNTR 48IN NAMIC - (25/CA)

## (undated) DEVICE — SET LEADWIRE 5 LEAD BEDSIDE DISPOSABLE ECG (1SET OF 5/EA)

## (undated) DEVICE — SHEATH RO 6F 10CM (10EA/BX)

## (undated) DEVICE — SUTURE 0 VICRYL PLUS CT-1 - 36 INCH (36/BX)

## (undated) DEVICE — GLIDECATH ANGLE 4F X 100CM (5EA/BX)

## (undated) DEVICE — COVER PROBE STERILE CONE (12EA/CA)

## (undated) DEVICE — SUTURE 5-0 PROLENE BLUE C-1 HS 1 X 30 (36EA/BX)"

## (undated) DEVICE — SPONGE GAUZESTER 4 X 4 4PLY - (128PK/CA)

## (undated) DEVICE — BLADE SURGICAL #11 - (50/BX)

## (undated) DEVICE — SUTURE 4-0 30CM STRATAFIX SPIRAL PS-2 (12EA/BX)

## (undated) DEVICE — HANDPIECE 10FT INTPLS SCT PLS IRRIGATION HAND CONTROL SET (6/PK)

## (undated) DEVICE — GLOVE BIOGEL PI INDICATOR SZ 6.5 SURGICAL PF LF - (50/BX 4BX/CA)

## (undated) DEVICE — SUTURE 2-0 VICRYL PLUS CT-1 36 (36PK/BX)"

## (undated) DEVICE — SUTURE 2-0 SILK 12 X 18" (36PK/BX)"

## (undated) DEVICE — SYRINGE 20 ML LL (50EA/BX 4BX/CA)

## (undated) DEVICE — SUTURE 2-0 VICRYL PLUS CT-1 - 8 X 18 INCH(12/BX)

## (undated) DEVICE — GOWN SURGEONS X-LARGE - DISP. (30/CA)

## (undated) DEVICE — GUIDEWIRE HYDROPHILIC COATED ANGLED TIP .035 X 260CM (5EA/BX)"

## (undated) DEVICE — TOWELS CLOTH SURGICAL - (4/PK 20PK/CA)

## (undated) DEVICE — CANISTER SUCTION 3000ML MECHANICAL FILTER AUTO SHUTOFF MEDI-VAC NONSTERILE LF DISP  (40EA/CA)

## (undated) DEVICE — RESERVOIR SUCTION 100 CC - SILICONE (20EA/CA)

## (undated) DEVICE — SHEATH DRYSEAL FLEX INTRODUCER 22FR X 28CM

## (undated) DEVICE — DRAIN J-VAC 7MM FLAT - (10EA/CA)

## (undated) DEVICE — COVER PROBE INTRAOPERATIVE KIT (10EA/CA)

## (undated) DEVICE — EXFX SN BAR 10.5 X 400MM - (2TX4=8)

## (undated) DEVICE — POLY UMBILICAL TAPE 1/8X30 - (36/BX)

## (undated) DEVICE — SUTURE 6-0 PROLENE BV-1 D/A 24 (36PK/BX)"

## (undated) DEVICE — BLADE SURGICAL #10 - (50/BX)

## (undated) DEVICE — PADDING CAST 6 IN STERILE - 6 X 4 YDS (24/CA)

## (undated) DEVICE — SUCTION INSTRUMENT YANKAUER BULBOUS TIP W/O VENT (50EA/CA)

## (undated) DEVICE — TRAY CATHETER FOLEY URINE METER W/STATLOCK 350ML (10EA/CA)

## (undated) DEVICE — SET EXTENSION WITH 2 PORTS (48EA/CA) ***PART #2C8610 IS A SUBSTITUTE*****

## (undated) DEVICE — MEDICINE CUP STERILE 2 OZ - (100/CA)

## (undated) DEVICE — PACK MINOR BASIN - (2EA/CA)

## (undated) DEVICE — PACK LOWER EXTREMITY - (2/CA)

## (undated) DEVICE — PACK AV FISTULA (4EA/CA)

## (undated) DEVICE — DRESSING PETROLEUM GAUZE 5 X 9" (50EA/BX 4BX/CA)"

## (undated) DEVICE — CATHETER 4FR OMNI FRESH 20CM SIZING

## (undated) DEVICE — CLIP MED INTNL HRZN TI ESCP - (25/BX)

## (undated) DEVICE — SUTURE 6-0 PROLENE C-1 D/A 24 (36PK/BX)"

## (undated) DEVICE — EXFX SN TRAC PIN SHORT 5X50MM - (2TX4=8)